# Patient Record
Sex: FEMALE | Race: WHITE | NOT HISPANIC OR LATINO | Employment: OTHER | URBAN - METROPOLITAN AREA
[De-identification: names, ages, dates, MRNs, and addresses within clinical notes are randomized per-mention and may not be internally consistent; named-entity substitution may affect disease eponyms.]

---

## 2017-02-24 ENCOUNTER — ALLSCRIPTS OFFICE VISIT (OUTPATIENT)
Dept: OTHER | Facility: OTHER | Age: 67
End: 2017-02-24

## 2017-03-16 ENCOUNTER — ALLSCRIPTS OFFICE VISIT (OUTPATIENT)
Dept: OTHER | Facility: OTHER | Age: 67
End: 2017-03-16

## 2017-03-16 ENCOUNTER — GENERIC CONVERSION - ENCOUNTER (OUTPATIENT)
Dept: OTHER | Facility: OTHER | Age: 67
End: 2017-03-16

## 2017-03-29 ENCOUNTER — APPOINTMENT (OUTPATIENT)
Dept: PHYSICAL THERAPY | Age: 67
End: 2017-03-29
Payer: COMMERCIAL

## 2017-03-29 PROCEDURE — 97161 PT EVAL LOW COMPLEX 20 MIN: CPT

## 2017-03-29 PROCEDURE — 97110 THERAPEUTIC EXERCISES: CPT

## 2017-04-03 ENCOUNTER — APPOINTMENT (OUTPATIENT)
Dept: PHYSICAL THERAPY | Age: 67
End: 2017-04-03
Payer: COMMERCIAL

## 2017-04-03 PROCEDURE — 97140 MANUAL THERAPY 1/> REGIONS: CPT

## 2017-04-03 PROCEDURE — 97110 THERAPEUTIC EXERCISES: CPT

## 2017-04-06 ENCOUNTER — APPOINTMENT (OUTPATIENT)
Dept: PHYSICAL THERAPY | Age: 67
End: 2017-04-06
Payer: COMMERCIAL

## 2017-04-06 PROCEDURE — 97110 THERAPEUTIC EXERCISES: CPT

## 2017-04-10 ENCOUNTER — APPOINTMENT (OUTPATIENT)
Dept: PHYSICAL THERAPY | Age: 67
End: 2017-04-10
Payer: COMMERCIAL

## 2017-04-13 ENCOUNTER — APPOINTMENT (OUTPATIENT)
Dept: PHYSICAL THERAPY | Age: 67
End: 2017-04-13
Payer: COMMERCIAL

## 2017-04-13 PROCEDURE — 97110 THERAPEUTIC EXERCISES: CPT

## 2017-04-17 ENCOUNTER — APPOINTMENT (OUTPATIENT)
Dept: PHYSICAL THERAPY | Age: 67
End: 2017-04-17
Payer: COMMERCIAL

## 2017-04-17 PROCEDURE — 97110 THERAPEUTIC EXERCISES: CPT

## 2017-04-24 ENCOUNTER — ALLSCRIPTS OFFICE VISIT (OUTPATIENT)
Dept: OTHER | Facility: OTHER | Age: 67
End: 2017-04-24

## 2017-05-03 ENCOUNTER — GENERIC CONVERSION - ENCOUNTER (OUTPATIENT)
Dept: OTHER | Facility: OTHER | Age: 67
End: 2017-05-03

## 2017-05-09 ENCOUNTER — APPOINTMENT (OUTPATIENT)
Dept: PHYSICAL THERAPY | Age: 67
End: 2017-05-09
Payer: COMMERCIAL

## 2017-05-09 PROCEDURE — 97161 PT EVAL LOW COMPLEX 20 MIN: CPT

## 2017-05-15 ENCOUNTER — APPOINTMENT (OUTPATIENT)
Dept: PHYSICAL THERAPY | Age: 67
End: 2017-05-15
Payer: COMMERCIAL

## 2017-05-15 PROCEDURE — 97110 THERAPEUTIC EXERCISES: CPT

## 2017-05-22 ENCOUNTER — APPOINTMENT (OUTPATIENT)
Dept: PHYSICAL THERAPY | Age: 67
End: 2017-05-22
Payer: COMMERCIAL

## 2017-05-22 PROCEDURE — 97110 THERAPEUTIC EXERCISES: CPT

## 2017-05-30 ENCOUNTER — ALLSCRIPTS OFFICE VISIT (OUTPATIENT)
Dept: OTHER | Facility: OTHER | Age: 67
End: 2017-05-30

## 2017-05-30 ENCOUNTER — GENERIC CONVERSION - ENCOUNTER (OUTPATIENT)
Dept: OTHER | Facility: OTHER | Age: 67
End: 2017-05-30

## 2017-06-01 DIAGNOSIS — Z12.31 ENCOUNTER FOR SCREENING MAMMOGRAM FOR MALIGNANT NEOPLASM OF BREAST: ICD-10-CM

## 2017-06-03 LAB
ADEQUACY: (HISTORICAL): NORMAL
CLINICIAN PROVIDIED ICD 9 OR 10 (HISTORICAL): NORMAL
COMMENT (HISTORICAL): NORMAL
DIAGNOSIS (HISTORICAL): NORMAL
HPV HIGH RISK (HISTORICAL): NEGATIVE
NOTE: (HISTORICAL): NORMAL
PERFORMED BY (HISTORICAL): NORMAL
QC REVIEWED BY (HISTORICAL): NORMAL

## 2017-06-05 ENCOUNTER — APPOINTMENT (OUTPATIENT)
Dept: PHYSICAL THERAPY | Age: 67
End: 2017-06-05
Payer: COMMERCIAL

## 2017-06-05 PROCEDURE — 97110 THERAPEUTIC EXERCISES: CPT

## 2017-09-05 ENCOUNTER — GENERIC CONVERSION - ENCOUNTER (OUTPATIENT)
Dept: OTHER | Facility: OTHER | Age: 67
End: 2017-09-05

## 2018-01-12 VITALS
TEMPERATURE: 98.2 F | SYSTOLIC BLOOD PRESSURE: 120 MMHG | DIASTOLIC BLOOD PRESSURE: 80 MMHG | RESPIRATION RATE: 14 BRPM | HEART RATE: 80 BPM

## 2018-01-12 NOTE — MISCELLANEOUS
Message  Spoke with patient regarding DEXA results  Per Dr Malia Willis, osteoporosis in the spine  T-score -3 3  Patient referred to Dr Indio rGiggs for further evaulation and treatment        Signatures   Electronically signed by : Karen Coats, [facility]; Jun 9 2016  9:55AM EST                       (Author)

## 2018-01-13 VITALS
TEMPERATURE: 97.2 F | HEART RATE: 76 BPM | WEIGHT: 155 LBS | SYSTOLIC BLOOD PRESSURE: 110 MMHG | RESPIRATION RATE: 16 BRPM | HEIGHT: 60 IN | DIASTOLIC BLOOD PRESSURE: 72 MMHG | BODY MASS INDEX: 30.43 KG/M2

## 2018-01-13 VITALS
BODY MASS INDEX: 28.94 KG/M2 | SYSTOLIC BLOOD PRESSURE: 110 MMHG | DIASTOLIC BLOOD PRESSURE: 74 MMHG | HEIGHT: 62 IN | WEIGHT: 157.25 LBS

## 2018-01-14 VITALS
DIASTOLIC BLOOD PRESSURE: 80 MMHG | HEART RATE: 76 BPM | SYSTOLIC BLOOD PRESSURE: 136 MMHG | RESPIRATION RATE: 16 BRPM | TEMPERATURE: 96.9 F

## 2018-01-15 NOTE — PROGRESS NOTES
Assessment    1  Acute URI (465 9) (J06 9)    Plan  Acute URI    · Benzonatate 200 MG Oral Capsule; TAKE 1 CAPSULE 3 TIMES DAILY AS  NEEDED    Discussion/Summary    Tea honey supportive care  The patient was counseled regarding  Chief Complaint  persistent sore throat      History of Present Illness  HPI: uri cough come and go two      Review of Systems    Constitutional: no fever and not feeling tired  Cardiovascular: no chest pain  Breasts: no breast swelling and no breast lump  Gastrointestinal: no abdominal pain  Genitourinary: no pelvic pain  Musculoskeletal: no arthralgias and no joint stiffness  Integumentary: no skin lesions  Neurological: no numbness and no confusion  Active Problems    1  Actinic keratosis (702 0) (L57 0)   2  Actinic keratosis (702 0) (L57 0)   3  Acute bronchitis (466 0) (J20 9)   4  Ankle pain, unspecified laterality (719 47) (M25 579)   5  Arthritis (716 90) (M19 90)   6  Cat scratch (919 0,E906 8) (T14 8,W55 03XA)   7  Cerumen impaction (380 4) (H61 20)   8  Cervicalgia (723 1) (M54 2)   9  Chronic rhinitis (472 0) (J31 0)   10  Closed nondisplaced fracture of proximal phalanx of lesser toe of left foot, initial    encounter (826 0) (S92 515A)   11  Colon cancer screening (V76 51) (Z12 11)   12  Depression (311) (F32 9)   13  Dermatitis (692 9) (L30 9)   14  Diverticulitis of colon (562 11) (K57 32)   15  Encounter for routine gynecological examination (V72 31) (Z01 419)   16  Encounter for screening (V82 9) (Z13 9)   17  Encounter for screening colonoscopy (V76 51) (Z12 11)   18  Encounter for screening mammogram for malignant neoplasm of breast (V76 12)    (Z12 31)   19  Ganglion (727 43) (M67 40)   20  Gastrointestinal symptoms (787 99) (R19 8)   21  Hemangioma (228 00) (D18 00)   22  Hepatic disorder (573 9) (K76 9)   23  Injury Of The Nose (959 09)   24  Joint Stiffness Finger(S) (719 54)   25  Lower abdominal pain (789 09) (R10 30)   26   Lumbago (724 2) (M54 5)   27  Menopause (627 2)   28  Migraine headache (346 90) (G43 909)   29  Myalgia And Myositis (729 1)   30  Need for pneumococcal vaccination (V03 82) (Z23)   31  Neoplasm of uncertain behavior of skin (238 2) (D48 5)   32  Neuritis (729 2) (M79 2)   33  Nonallopathic lesion (739 9) (M99 09)   34  Osteoporosis (733 00) (M81 0)   35  Other muscle spasm (728 85) (M62 838)   36  Pap test, as part of routine gynecological examination (V76 2) (Z12 4)   37  Pigmented nevus (216 9) (D22 9)   38  Plantar warts (078 12) (B07 0)   39  Polymyalgia rheumatica (725) (M35 3)   40  Postmenopausal atrophic vaginitis (627 3) (N95 2)   41  Rupture of popliteal cyst (727 51) (M66 0)   42  Sacroiliitis (720 2) (M46 1)   43  Sciatica (724 3) (M54 30)   44  Screening for osteoporosis (V82 81) (Z13 820)   45  Sigmoid diverticulitis (562 11) (K57 32)   46  Temporomandibular dysfunction syndrome (524 60) (M26 60)   47  Tendonitis (726 90) (M77 9)   48  Thoracic spine pain (724 1) (M54 6)   49  Visit for screening mammogram (V76 12) (Z12 31)   50  Vulvar atrophy (624 1) (N90 5)   51  Wart (078 10) (B07 9)    Past Medical History    1  History of Acute upper respiratory infection (465 9) (J06 9)   2  Benign essential hypertension (401 1) (I10)   3  History of Encounter for removal of sutures (V58 32) (Z48 02)   4  History of  3 (V22 2) (Z33 1)   5  History of Heart murmur (785 2) (R01 1)   6  History of acute bronchitis (V12 69) (Z87 09)   7  History of acute pharyngitis (V12 69) (Z87 09)   8  History of low back pain (V13 59) (Z87 39)   9  History of Osteopenia (733 90) (M85 80)   10  History of Rheumatic fever (390) (I00)   11  Screening for genitourinary condition (V81 6) (Z13 89)   12  History of Uterine fibroid (218 9) (D25 9)  Active Problems And Past Medical History Reviewed: The active problems and past medical history were reviewed and updated today  Family History    1   Family history of Bone Cancer 2  Family history of Coronary Arteriosclerosis (V17 49)  Family History Reviewed: The family history was reviewed and updated today  Social History    · Being A Social Drinker   · Marital History - Currently    · Never a smoker  The social history was reviewed and updated today  The social history was reviewed and is unchanged  Surgical History    1  History of Biopsy Of Cervix   2  History of Tonsillectomy With Adenoidectomy  Surgical History Reviewed: The surgical history was reviewed and updated today  Current Meds   1  Calcium 600 600 MG Oral Tablet; 1 Two Times A Day; Therapy: 14ZQD2006 to  Requested for: 78VHN3960 Recorded   2  Multivitamins TABS; 1 Every Day; Therapy: 45NIW9324 to  Requested for: 10MJR4952 Recorded    The medication list was reviewed and updated today  Allergies    1  Penicillins   2  Tetracyclines   3  Vicks Formula 44M    4  Latex   5  Shellfish    Vitals   Recorded: 97AMZ7655 02:43PM   Temperature 96 9 F   Heart Rate 84   Respiration 16   Systolic 248   Diastolic 70     Physical Exam    Constitutional   General appearance: No acute distress, well appearing and well nourished  Eyes   Pupils and irises: Equal, round and reactive to light  Ears, Nose, Mouth, and Throat   External inspection of ears and nose: Normal     Otoscopic examination: Tympanic membranes translucent with normal light reflex  Canals patent without erythema  Oropharynx: Normal with no erythema, edema, exudate or lesions  Pulmonary   Auscultation of lungs: Clear to auscultation  no rales or crackles were heard bilaterally  no rhonchi  no friction rub  no wheezing  Cardiovascular   Auscultation of heart: Normal rate and rhythm, normal S1 and S2, without murmurs  Examination of extremities for edema and/or varicosities: Normal   no edema  Abdomen   Abdomen: Abnormal   The abdomen was flat  Bowel sounds were normal  There was mild tenderness in the suprapubic area   The abdomen was not firm and not rigid  No rebound tenderness  No guarding  Lymphatic   Palpation of lymph nodes in neck: No lymphadenopathy  Musculoskeletal   Gait and station: Normal     Inspection/palpation of joints, bones, and muscles: Normal     Skin   Skin and subcutaneous tissue: Normal without rashes or lesions  Neurologic   Cranial nerves: Cranial nerves 2-12 intact  Reflexes: 2+ and symmetric  Sensation: No sensory loss      Psychiatric   Orientation to person, place, and time: Normal     Mood and affect: Normal          Signatures   Electronically signed by : Maye Crouch MD; Jan 28 2016  2:50PM EST                       (Author)

## 2018-03-01 ENCOUNTER — OFFICE VISIT (OUTPATIENT)
Dept: FAMILY MEDICINE CLINIC | Facility: CLINIC | Age: 68
End: 2018-03-01
Payer: COMMERCIAL

## 2018-03-01 VITALS
TEMPERATURE: 98.4 F | BODY MASS INDEX: 27.79 KG/M2 | HEIGHT: 62 IN | SYSTOLIC BLOOD PRESSURE: 108 MMHG | HEART RATE: 72 BPM | RESPIRATION RATE: 16 BRPM | WEIGHT: 151 LBS | DIASTOLIC BLOOD PRESSURE: 70 MMHG

## 2018-03-01 DIAGNOSIS — J04.0 LARYNGITIS: Primary | ICD-10-CM

## 2018-03-01 DIAGNOSIS — Z13.6 SCREENING FOR CARDIOVASCULAR CONDITION: Primary | ICD-10-CM

## 2018-03-01 PROBLEM — M22.42 CHONDROMALACIA OF LEFT PATELLA: Status: ACTIVE | Noted: 2017-02-24

## 2018-03-01 PROCEDURE — 99213 OFFICE O/P EST LOW 20 MIN: CPT | Performed by: FAMILY MEDICINE

## 2018-03-01 RX ORDER — DIPHENOXYLATE HYDROCHLORIDE AND ATROPINE SULFATE 2.5; .025 MG/1; MG/1
TABLET ORAL DAILY
COMMUNITY
Start: 2005-07-05

## 2018-03-01 RX ORDER — PHENOL 1.4 %
AEROSOL, SPRAY (ML) MUCOUS MEMBRANE 2 TIMES DAILY
COMMUNITY
Start: 2005-07-05

## 2018-03-01 RX ORDER — METHYLPREDNISOLONE 4 MG/1
TABLET ORAL
Qty: 21 TABLET | Refills: 0 | Status: SHIPPED | OUTPATIENT
Start: 2018-03-01 | End: 2020-01-22 | Stop reason: ALTCHOICE

## 2018-03-01 NOTE — PROGRESS NOTES
Subjective:           Sarah Pisano was seen today for sore throat  Diagnoses and all orders for this visit:    Laryngitis            No orders of the defined types were placed in this encounter  There are no Patient Instructions on file for this visit  Mario Brar      HPI  St 3-4 days no fever  hoarseness no cough or dysphagia  Vitals:    03/01/18 1507   BP: 108/70   Pulse: 72   Resp: 16   Temp: 98 4 °F (36 9 °C)       Allergies   Allergen Reactions    Penicillins Hives     Reaction Date: [id number];     Shellfish Allergy      Reaction Date: 47GKQ8996;     Tetracyclines & Related Hives     Reaction Date: 79ALY1395;     Vicks Babyrub  [Liniments]     Latex Rash     Reaction Date: 31Aug2010; No current outpatient prescriptions on file prior to visit  No current facility-administered medications on file prior to visit  History reviewed  No pertinent past medical history  History reviewed  No pertinent surgical history  reports that she has never smoked  She has never used smokeless tobacco  She reports that she drinks alcohol  She reports that she does not use drugs  reports that she has never smoked  She has never used smokeless tobacco     Review of Systems   Constitutional: Negative for activity change and unexpected weight change  HENT: Positive for sore throat and voice change  Negative for congestion, drooling, mouth sores, sinus pain and trouble swallowing  Eyes: Negative for pain  Respiratory: Negative for apnea, cough, chest tightness, shortness of breath, wheezing and stridor  Hoarse voice   Cardiovascular: Negative for chest pain  Gastrointestinal: Negative for abdominal distention, blood in stool and rectal pain  Endocrine: Negative for cold intolerance and polydipsia  Genitourinary: Negative for flank pain and urgency  Musculoskeletal: Positive for arthralgias  Negative for neck pain and neck stiffness     Skin: Negative for color change and rash  Neurological: Negative for dizziness, tremors, seizures and headaches  Hematological: Negative for adenopathy  Psychiatric/Behavioral: Negative for agitation, behavioral problems, confusion, sleep disturbance and suicidal ideas  All other systems reviewed and are negative  Physical Exam   Constitutional: She appears well-developed  HENT:   Head: Normocephalic and atraumatic  Post pharyngeal erythema no exudate   Eyes: Conjunctivae and EOM are normal  Pupils are equal, round, and reactive to light  No scleral icterus  Neck: No JVD present  No thyromegaly present  Cardiovascular: Normal rate and regular rhythm  No murmur heard  Pulmonary/Chest: Effort normal and breath sounds normal  No stridor  She has no wheezes  Abdominal: She exhibits no distension  There is no guarding  Musculoskeletal: She exhibits no deformity  Lymphadenopathy:     She has no cervical adenopathy  Neurological: No cranial nerve deficit  Skin: Capillary refill takes less than 2 seconds  No erythema  Psychiatric: She has a normal mood and affect   Her behavior is normal  Judgment and thought content normal

## 2018-03-03 ENCOUNTER — OFFICE VISIT (OUTPATIENT)
Dept: FAMILY MEDICINE CLINIC | Facility: CLINIC | Age: 68
End: 2018-03-03
Payer: COMMERCIAL

## 2018-03-03 VITALS
HEART RATE: 82 BPM | SYSTOLIC BLOOD PRESSURE: 118 MMHG | WEIGHT: 148 LBS | BODY MASS INDEX: 27.23 KG/M2 | RESPIRATION RATE: 16 BRPM | HEIGHT: 62 IN | DIASTOLIC BLOOD PRESSURE: 68 MMHG | TEMPERATURE: 96.8 F

## 2018-03-03 DIAGNOSIS — B96.89 ACUTE BACTERIAL PHARYNGITIS: Primary | ICD-10-CM

## 2018-03-03 DIAGNOSIS — J02.8 ACUTE BACTERIAL PHARYNGITIS: Primary | ICD-10-CM

## 2018-03-03 PROCEDURE — 3008F BODY MASS INDEX DOCD: CPT | Performed by: FAMILY MEDICINE

## 2018-03-03 PROCEDURE — 99213 OFFICE O/P EST LOW 20 MIN: CPT | Performed by: FAMILY MEDICINE

## 2018-03-03 RX ORDER — AZITHROMYCIN 200 MG/5ML
POWDER, FOR SUSPENSION ORAL
Qty: 30 ML | Refills: 0 | Status: SHIPPED | OUTPATIENT
Start: 2018-03-03 | End: 2020-01-22 | Stop reason: ALTCHOICE

## 2018-03-03 RX ORDER — IBUPROFEN 200 MG
TABLET ORAL EVERY 6 HOURS PRN
COMMUNITY
End: 2020-01-22 | Stop reason: ALTCHOICE

## 2018-03-03 NOTE — PROGRESS NOTES
Assessment/Plan:    No problem-specific Assessment & Plan notes found for this encounter  Diagnoses and all orders for this visit:    Acute bacterial pharyngitis  -     azithromycin (ZITHROMAX) 200 mg/5 mL suspension; Give the patient 672 mg (16 8 ml) by mouth the first day then 336 mg (8 4 ml) by mouth daily for 4 days  Other orders  -     ibuprofen (MOTRIN) 200 mg tablet; Take by mouth every 6 (six) hours as needed for mild pain          There are no Patient Instructions on file for this visit  No Follow-up on file  Subjective:      Patient ID: Rachael Bateman is a 75 y o  female  Chief Complaint   Patient presents with    Sore Throat    Laryngitis    Cough     with phlegm       Pt cannot talk history taken via   Pt was seen  At [facility], was oprescribed mouthwash for pain - which the pt could not find as well as a pack of steroids  Pt did not want to take the steroids as she gets palpitations generally  Pt took some advil over two days  Sore Throat    Associated symptoms include congestion and coughing  Pertinent negatives include no abdominal pain  Cough   Associated symptoms include a sore throat  Pertinent negatives include no chest pain, chills or wheezing  The following portions of the patient's history were reviewed and updated as appropriate: allergies, current medications, past family history, past medical history, past social history, past surgical history and problem list     Review of Systems   Constitutional: Negative for activity change, appetite change and chills  HENT: Positive for congestion and sore throat  Eyes: Negative for discharge and itching  Respiratory: Positive for cough  Negative for wheezing  Cardiovascular: Negative for chest pain, palpitations and leg swelling  Gastrointestinal: Negative for abdominal pain  Endocrine: Negative for cold intolerance and heat intolerance           Current Outpatient Prescriptions   Medication Sig Dispense Refill    calcium carbonate (CALCIUM 600) 600 MG tablet Take by mouth 2 (two) times a day      ibuprofen (MOTRIN) 200 mg tablet Take by mouth every 6 (six) hours as needed for mild pain      multivitamin (THERAGRAN) TABS Take by mouth daily      al mag oxide-diphenhydramine-lidocaine viscous (MAGIC MOUTHWASH) 1:1:1 suspension Swish and spit 10 mL every 4 (four) hours as needed for mouth pain or discomfort 180 mL 0    azithromycin (ZITHROMAX) 200 mg/5 mL suspension Give the patient 672 mg (16 8 ml) by mouth the first day then 336 mg (8 4 ml) by mouth daily for 4 days  30 mL 0    Methylprednisolone 4 MG TBPK Use as directed on package 21 tablet 0     No current facility-administered medications for this visit  Objective:    /68   Pulse 82   Temp (!) 96 8 °F (36 °C)   Resp 16   Ht 5' 2" (1 575 m)   Wt 67 1 kg (148 lb)   BMI 27 07 kg/m²        Physical Exam   Constitutional: She appears well-developed and well-nourished  HENT:   Head: Normocephalic and atraumatic  Right Ear: Tympanic membrane is bulging  Left Ear: Tympanic membrane is bulging  Nose: Mucosal edema present  Mouth/Throat:       Eyes: Pupils are equal, round, and reactive to light  Neck: Normal range of motion  Cardiovascular: Normal rate  Pulmonary/Chest: Effort normal    Abdominal: Soft  Skin: She is not diaphoretic  Nursing note and vitals reviewed               Sanna Broderick DO

## 2018-03-03 NOTE — PATIENT INSTRUCTIONS
Cold Symptoms   AMBULATORY CARE:   Cold symptoms  include sneezing, dry throat, a stuffy nose, headache, watery eyes, and a cough  Your cough may be dry, or you may cough up mucus  You may also have muscle aches, joint pain, and tiredness  Rarely, you may have a fever  Cold symptoms occur from inflammation in your upper respiratory system caused by a virus  Most colds go away without treatment  Seek care immediately if:   · You have increased tiredness and weakness  · You are unable to eat  · Your heart is beating much faster than usual for you  · You see white spots in the back of your throat and your neck is swollen and sore to the touch  · You see pinpoint or larger reddish-purple dots on your skin  Contact your healthcare provider if:   · You have a fever higher than 102°F (38 9°C)  · You have new or worsening shortness of breath  · You have thick nasal drainage for more than 2 days  · Your symptoms do not improve or get worse within 5 days  · You have questions or concerns about your condition or care  Treatment for cold symptoms  may include NSAIDS to decrease muscle aches and fever  Cold medicines may also be given to decrease coughing, nasal stuffiness, sneezing, and a runny nose  Manage your cold symptoms: The following may help relieve cold symptoms, such as a dry throat and congestion:  · Gargle with mouthwash or warm salt water as directed  · Suck on throat lozenges or hard candy  · Use a cold or warm vaporizer or humidifier to ease your breathing  · Rest for at least 2 days and then as needed to decrease tiredness and weakness  · Use petroleum based jelly around your nostrils to decrease irritation from blowing your nose  · Drink plenty of liquids  Liquids will help thin and loosen thick mucus so you can cough it up  Liquids will also keep you hydrated   Ask your healthcare provider which liquids are best for you and how much to drink each day   Prevent the spread of germs  by washing your hands often  You can spread your cold germs to others for at least 3 days after your symptoms start  Do not share items, such as eating utensils  Cover your nose and mouth when you cough or sneeze using the crook of your elbow instead of your hands  Throw used tissues in the garbage  Do not smoke:  Smoking may worsen your symptoms and increase the length of time you feel sick  Talk with your healthcare provider if you need help to stop smoking  Follow up with your healthcare provider as directed:  Write down your questions so you remember to ask them during your visits  © 2017 [facility] Information is for End User's use only and may not be sold, redistributed or otherwise used for commercial purposes  All illustrations and images included in CareNotes® are the copyrighted property of [employer]  or [employer]  The above information is an  only  It is not intended as medical advice for individual conditions or treatments  Talk to your doctor, nurse or pharmacist before following any medical regimen to see if it is safe and effective for you

## 2018-10-30 ENCOUNTER — ANNUAL EXAM (OUTPATIENT)
Dept: OBGYN CLINIC | Facility: CLINIC | Age: 68
End: 2018-10-30
Payer: COMMERCIAL

## 2018-10-30 VITALS — WEIGHT: 151.4 LBS | DIASTOLIC BLOOD PRESSURE: 70 MMHG | BODY MASS INDEX: 27.69 KG/M2 | SYSTOLIC BLOOD PRESSURE: 118 MMHG

## 2018-10-30 DIAGNOSIS — Z12.39 BREAST CANCER SCREENING: Primary | ICD-10-CM

## 2018-10-30 DIAGNOSIS — Z01.419 ENCOUNTER FOR ANNUAL ROUTINE GYNECOLOGICAL EXAMINATION: ICD-10-CM

## 2018-10-30 PROCEDURE — G0101 CA SCREEN;PELVIC/BREAST EXAM: HCPCS | Performed by: OBSTETRICS & GYNECOLOGY

## 2018-10-30 NOTE — PROGRESS NOTES
Assessment/Plan:    No problem-specific Assessment & Plan notes found for this encounter  Normal gynecological physical examination  Self-breast examination stressed  Mammogram ordered  Discussed regular exercise, healthy diet, importance of vitamin D and calcium supplements  Discussed importance of sun block use during periods of prolonged sun exposure  Patient will be seen in 1 year for routine gynecologic and medical examination  Patient will call office for any problems, concerns, or issues which may arise during the interim  Diagnoses and all orders for this visit:    Breast cancer screening  -     Mammo screening bilateral w cad; Future    Encounter for annual routine gynecological examination        Subjective:      Patient ID: Camden Campos is a 70 y o  female  Patient is a 69-year-old female who presents to the office for her annual gynecologic and medical examination  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 [place] and she and her  just got back from a trip to [place]  Patient has no complaints today  Patient has a history of osteoporosis and was taking Fosamax, but recently stopped the medication due to upcoming dental work  She denies any vaginal bleeding or discharge, pelvic pain, abdominal pain/discharge, vasomotor symptoms, insomnia, mood lability or weight change  She reports normal bowel and bladder habits  She denies any breast changes, masses, pain or discharge  She does regular self-breast examinations and is up to date on mammography  She is up to date on colonoscopy screening  She is not currently being followed for any significant medical issues  The following portions of the patient's history were reviewed and updated as appropriate: allergies, current medications, past family history, past medical history, past social history, past surgical history and problem list     Review of Systems   Constitutional: Negative  Negative for unexpected weight change  HENT: Negative  Eyes: Negative  Respiratory: Negative  Cardiovascular: Negative  Gastrointestinal: Negative  Negative for abdominal pain, blood in stool, nausea and vomiting  Endocrine: Negative  Genitourinary: Negative  Negative for dysuria, frequency, hematuria, pelvic pain, vaginal bleeding and vaginal discharge  Musculoskeletal: Negative  Skin: Negative  Neurological: Negative  Psychiatric/Behavioral: Negative  Objective:      /70   Wt 68 7 kg (151 lb 6 4 oz)   BMI 27 69 kg/m²          Physical Exam   Constitutional: She appears well-developed and well-nourished  HENT:   Head: Normocephalic  Eyes: Pupils are equal, round, and reactive to light  Neck: Normal range of motion  Neck supple  Cardiovascular: Normal rate and regular rhythm  Pulmonary/Chest: Effort normal and breath sounds normal  Right breast exhibits no inverted nipple, no mass, no nipple discharge, no skin change and no tenderness  Left breast exhibits no inverted nipple, no mass, no nipple discharge, no skin change and no tenderness  Breasts are symmetrical    Abdominal: Soft  Normal appearance and bowel sounds are normal    Genitourinary: Rectum normal, vagina normal and uterus normal  Rectal exam shows guaiac negative stool  Pelvic exam was performed with patient supine  Right adnexum displays no mass, no tenderness and no fullness  Left adnexum displays no mass, no tenderness and no fullness  No vaginal discharge found  Lymphadenopathy:     She has no cervical adenopathy  She has no axillary adenopathy  Right: No inguinal and no supraclavicular adenopathy present  Left: No inguinal and no supraclavicular adenopathy present  Neurological: She is alert  Skin: Skin is intact  No rash noted  Psychiatric: She has a normal mood and affect   Her speech is normal and behavior is normal  Judgment and thought content normal  Cognition and memory are normal

## 2019-01-31 ENCOUNTER — HOSPITAL ENCOUNTER (OUTPATIENT)
Dept: MAMMOGRAPHY | Facility: HOSPITAL | Age: 69
Discharge: HOME | End: 2019-01-31
Payer: COMMERCIAL

## 2019-01-31 VITALS — HEIGHT: 62 IN | WEIGHT: 151 LBS | BODY MASS INDEX: 27.79 KG/M2

## 2019-01-31 DIAGNOSIS — Z12.39 BREAST CANCER SCREENING: ICD-10-CM

## 2019-01-31 PROCEDURE — 77067 SCR MAMMO BI INCL CAD: CPT

## 2020-01-22 ENCOUNTER — OFFICE VISIT (OUTPATIENT)
Dept: FAMILY MEDICINE CLINIC | Facility: CLINIC | Age: 70
End: 2020-01-22
Payer: COMMERCIAL

## 2020-01-22 VITALS
DIASTOLIC BLOOD PRESSURE: 86 MMHG | HEIGHT: 62 IN | TEMPERATURE: 96.6 F | SYSTOLIC BLOOD PRESSURE: 140 MMHG | OXYGEN SATURATION: 97 % | RESPIRATION RATE: 16 BRPM | BODY MASS INDEX: 27.94 KG/M2 | WEIGHT: 151.8 LBS | HEART RATE: 72 BPM

## 2020-01-22 DIAGNOSIS — Z12.11 SCREEN FOR COLON CANCER: ICD-10-CM

## 2020-01-22 DIAGNOSIS — G43.909 MIGRAINE WITHOUT STATUS MIGRAINOSUS, NOT INTRACTABLE, UNSPECIFIED MIGRAINE TYPE: Primary | ICD-10-CM

## 2020-01-22 DIAGNOSIS — Z11.59 NEED FOR HEPATITIS C SCREENING TEST: ICD-10-CM

## 2020-01-22 DIAGNOSIS — Z13.6 SCREENING FOR CARDIOVASCULAR CONDITION: ICD-10-CM

## 2020-01-22 PROCEDURE — 3725F SCREEN DEPRESSION PERFORMED: CPT | Performed by: FAMILY MEDICINE

## 2020-01-22 PROCEDURE — 1160F RVW MEDS BY RX/DR IN RCRD: CPT | Performed by: FAMILY MEDICINE

## 2020-01-22 PROCEDURE — 3288F FALL RISK ASSESSMENT DOCD: CPT | Performed by: FAMILY MEDICINE

## 2020-01-22 PROCEDURE — 1100F PTFALLS ASSESS-DOCD GE2>/YR: CPT | Performed by: FAMILY MEDICINE

## 2020-01-22 PROCEDURE — 1036F TOBACCO NON-USER: CPT | Performed by: FAMILY MEDICINE

## 2020-01-22 PROCEDURE — 3008F BODY MASS INDEX DOCD: CPT | Performed by: FAMILY MEDICINE

## 2020-01-22 PROCEDURE — 99213 OFFICE O/P EST LOW 20 MIN: CPT | Performed by: FAMILY MEDICINE

## 2020-01-22 RX ORDER — RIZATRIPTAN BENZOATE 5 MG/1
5 TABLET, ORALLY DISINTEGRATING ORAL ONCE AS NEEDED
Qty: 9 TABLET | Refills: 0 | Status: SHIPPED | OUTPATIENT
Start: 2020-01-22 | End: 2022-03-29 | Stop reason: ALTCHOICE

## 2020-01-22 NOTE — PROGRESS NOTES
Chief Complaint   Patient presents with    Migraines     [id number]       Subjective:           Problem List Items Addressed This Visit        Cardiovascular and Mediastinum    Migraine headache - Primary    Relevant Medications    rizatriptan (MAXALT-MLT) 5 MG disintegrating tablet    Other Relevant Orders    Comprehensive metabolic panel      Other Visit Diagnoses     Screening for cardiovascular condition        Relevant Orders    Comprehensive metabolic panel    Lipid panel    Screen for colon cancer        Relevant Orders    Cologuard    Need for hepatitis C screening test        Relevant Orders    Hepatitis C antibody              Orders Placed This Encounter   Procedures    Cologuard     Standing Status:   Future     Standing Expiration Date:   1/22/2021    Comprehensive metabolic panel     This is a patient instruction: Patient fasting for 8 hours or longer recommended  Standing Status:   Future     Standing Expiration Date:   1/22/2021    Lipid panel     This is a patient instruction: This test requires patient fasting for 10-12 hours or longer  Drinking of black coffee or black tea is acceptable  Standing Status:   Future     Standing Expiration Date:   1/22/2021    Hepatitis C antibody     Standing Status:   Future     Standing Expiration Date:   1/22/2021       Patient Instructions   Stay current with GYN, Mammogram, Colonoscopy   Consider Cologuard  Neurology follow up if migraines worsen or persist       Riboflavin 400mg daily   Hydrate welll    BMI Counseling: Body mass index is 27 76 kg/m²  Discussed the patient's BMI with her  [facility]    Chief Complaint   Patient presents with   Mercedes Rede is in for follow-up migraine headaches ocular  In nature  Under Optho care  Field test  Lite sensitive  Stress      /86   Pulse 72   Temp (!) 96 6 °F (35 9 °C)   Resp 16   Ht 5' 2" (1 575 m)   Wt 68 9 kg (151 lb 12 8 oz)   SpO2 97%   BMI 27 76 kg/m²       Allergies   Allergen Reactions    Blistex Medicated [Lip Medex]     Penicillins Hives     Reaction Date: [id number];     Shellfish Allergy      Reaction Date: 03Mar2005;     Tetracyclines & Related Hives     Reaction Date: 03Mar2005;     Vicks Babyrub  [Liniments]     Latex Rash     Reaction Date: 31Aug2010;        Current Outpatient Medications on File Prior to Visit   Medication Sig Dispense Refill    calcium carbonate (CALCIUM 600) 600 MG tablet Take by mouth 2 (two) times a day      multivitamin (THERAGRAN) TABS Take by mouth daily      [DISCONTINUED] al mag oxide-diphenhydramine-lidocaine viscous (MAGIC MOUTHWASH) 1:1:1 suspension Swish and spit 10 mL every 4 (four) hours as needed for mouth pain or discomfort 180 mL 0    [DISCONTINUED] azithromycin (ZITHROMAX) 200 mg/5 mL suspension Give the patient 672 mg (16 8 ml) by mouth the first day then 336 mg (8 4 ml) by mouth daily for 4 days  30 mL 0    [DISCONTINUED] ibuprofen (MOTRIN) 200 mg tablet Take by mouth every 6 (six) hours as needed for mild pain      [DISCONTINUED] Methylprednisolone 4 MG TBPK Use as directed on package 21 tablet 0     No current facility-administered medications on file prior to visit  No past medical history on file  No past surgical history on file  reports that she has never smoked  She has never used smokeless tobacco  She reports that she drinks alcohol  She reports that she does not use drugs  reports that she has never smoked  She has never used smokeless tobacco         Review of Systems   Constitutional: Negative for activity change, chills and fever  HENT: Negative for dental problem, hearing loss, sinus pain, sore throat and trouble swallowing  Eyes: Positive for photophobia and visual disturbance  Negative for pain  Field cut then waves  Blind spot  Respiratory: Negative for apnea, cough, chest tightness, shortness of breath, wheezing and stridor      Cardiovascular: Negative for chest pain  Gastrointestinal: Negative for abdominal distention, blood in stool and rectal pain  Endocrine: Negative for cold intolerance and polydipsia  Genitourinary: Negative for flank pain and urgency  Musculoskeletal: Negative for arthralgias, gait problem and neck pain  Skin: Negative for color change and rash  Neurological: Negative for dizziness, tremors, seizures, facial asymmetry, weakness, light-headedness, numbness and headaches  Hematological: Negative for adenopathy  Psychiatric/Behavioral: Negative for agitation, behavioral problems, confusion, self-injury, sleep disturbance and suicidal ideas  All other systems reviewed and are negative  Physical Exam   Constitutional: She appears well-developed  HENT:   Head: Normocephalic and atraumatic  Mouth/Throat: Oropharynx is clear and moist    Eyes: Pupils are equal, round, and reactive to light  Conjunctivae and EOM are normal  No scleral icterus  Neck: No JVD present  No thyromegaly present  Cardiovascular: Normal rate and regular rhythm  No murmur heard  Pulmonary/Chest: Effort normal and breath sounds normal  No stridor  Abdominal: She exhibits no distension  There is no guarding  Musculoskeletal: Normal range of motion  She exhibits no edema, tenderness or deformity  Lymphadenopathy:     She has no cervical adenopathy  Neurological: No cranial nerve deficit  Skin: Capillary refill takes less than 2 seconds  No erythema  Psychiatric: She has a normal mood and affect   Her behavior is normal  Judgment and thought content normal

## 2020-01-22 NOTE — PATIENT INSTRUCTIONS
Stay current with GYN, Mammogram, Colonoscopy   Consider Cologuard  Neurology follow up if migraines worsen or persist       Riboflavin 400mg daily      Hydrate welll

## 2021-01-21 ENCOUNTER — LAB (OUTPATIENT)
Dept: LAB | Facility: CLINIC | Age: 71
End: 2021-01-21
Payer: COMMERCIAL

## 2021-01-21 ENCOUNTER — TRANSCRIBE ORDERS (OUTPATIENT)
Dept: LAB | Facility: CLINIC | Age: 71
End: 2021-01-21

## 2021-01-21 DIAGNOSIS — Z11.59 NEED FOR HEPATITIS C SCREENING TEST: ICD-10-CM

## 2021-01-21 DIAGNOSIS — Z13.6 SCREENING FOR CARDIOVASCULAR CONDITION: ICD-10-CM

## 2021-01-21 DIAGNOSIS — G43.909 MIGRAINE WITHOUT STATUS MIGRAINOSUS, NOT INTRACTABLE, UNSPECIFIED MIGRAINE TYPE: ICD-10-CM

## 2021-01-21 LAB
ALBUMIN SERPL BCP-MCNC: 4 G/DL (ref 3.5–5)
ALP SERPL-CCNC: 97 U/L (ref 46–116)
ALT SERPL W P-5'-P-CCNC: 23 U/L (ref 12–78)
ANION GAP SERPL CALCULATED.3IONS-SCNC: 3 MMOL/L (ref 4–13)
AST SERPL W P-5'-P-CCNC: 22 U/L (ref 5–45)
BILIRUB SERPL-MCNC: 0.56 MG/DL (ref 0.2–1)
BUN SERPL-MCNC: 13 MG/DL (ref 5–25)
CALCIUM SERPL-MCNC: 9.2 MG/DL (ref 8.3–10.1)
CHLORIDE SERPL-SCNC: 108 MMOL/L (ref 100–108)
CHOLEST SERPL-MCNC: 280 MG/DL (ref 50–200)
CO2 SERPL-SCNC: 30 MMOL/L (ref 21–32)
CREAT SERPL-MCNC: 0.61 MG/DL (ref 0.6–1.3)
GFR SERPL CREATININE-BSD FRML MDRD: 92 ML/MIN/1.73SQ M
GLUCOSE P FAST SERPL-MCNC: 99 MG/DL (ref 65–99)
HDLC SERPL-MCNC: 91 MG/DL
LDLC SERPL CALC-MCNC: 173 MG/DL (ref 0–100)
NONHDLC SERPL-MCNC: 189 MG/DL
POTASSIUM SERPL-SCNC: 4.3 MMOL/L (ref 3.5–5.3)
PROT SERPL-MCNC: 7 G/DL (ref 6.4–8.2)
SODIUM SERPL-SCNC: 141 MMOL/L (ref 136–145)
TRIGL SERPL-MCNC: 78 MG/DL

## 2021-01-21 PROCEDURE — 36415 COLL VENOUS BLD VENIPUNCTURE: CPT

## 2021-01-21 PROCEDURE — 80053 COMPREHEN METABOLIC PANEL: CPT

## 2021-01-21 PROCEDURE — 86803 HEPATITIS C AB TEST: CPT

## 2021-01-21 PROCEDURE — 80061 LIPID PANEL: CPT

## 2021-01-22 LAB — HCV AB SER QL: ABNORMAL

## 2021-01-27 ENCOUNTER — ANNUAL EXAM (OUTPATIENT)
Dept: OBGYN CLINIC | Facility: CLINIC | Age: 71
End: 2021-01-27
Payer: COMMERCIAL

## 2021-01-27 VITALS
DIASTOLIC BLOOD PRESSURE: 90 MMHG | SYSTOLIC BLOOD PRESSURE: 130 MMHG | BODY MASS INDEX: 24.03 KG/M2 | WEIGHT: 144.2 LBS | HEIGHT: 65 IN

## 2021-01-27 DIAGNOSIS — Z01.419 ENCOUNTER FOR GYNECOLOGICAL EXAMINATION WITHOUT ABNORMAL FINDING: Primary | ICD-10-CM

## 2021-01-27 DIAGNOSIS — Z12.31 ENCOUNTER FOR SCREENING MAMMOGRAM FOR MALIGNANT NEOPLASM OF BREAST: ICD-10-CM

## 2021-01-27 DIAGNOSIS — M81.0 OSTEOPOROSIS, UNSPECIFIED OSTEOPOROSIS TYPE, UNSPECIFIED PATHOLOGICAL FRACTURE PRESENCE: ICD-10-CM

## 2021-01-27 DIAGNOSIS — Z12.11 COLON CANCER SCREENING: ICD-10-CM

## 2021-01-27 PROCEDURE — G0101 CA SCREEN;PELVIC/BREAST EXAM: HCPCS | Performed by: PHYSICIAN ASSISTANT

## 2021-01-27 NOTE — PROGRESS NOTES
Assessment/Plan:    No problem-specific Assessment & Plan notes found for this encounter  Diagnoses and all orders for this visit:    Encounter for gynecological examination without abnormal finding    Encounter for screening mammogram for malignant neoplasm of breast  -     Mammo screening bilateral w 3d & cad; Future    Osteoporosis, unspecified osteoporosis type, unspecified pathological fracture presence  -     DXA bone density spine hip and pelvis; Future    Colon cancer screening  -     Cologuard; Future        Pap not indicated  Order for mammogram entered  Stressed the importance of monthly SBE  Order for DXA scan entered  Recommended f/u with rheumatologist for osteoporosis  Cologuard ordered; office will fax form  We will call with results  If no problems, patient to return in  1 year for routine gyn care  Subjective:      Patient ID: Trudi Matias is a 69 y o  female  Patient is here for yearly gyn exam   States she is doing well overall  Denies any lingering menopausal issues  She also denies bowel/bladder changes, vaginal bleeding, pelvic pain, bloating, abdominal pain, n/v, change in appetite, and thyroid disease  Patient has history of osteoporosis; has not had a DXA scan in 5 years  Was on Fosamax previously but stopped due to need for dental work  Has not had regular f/u with her rheumatologist   She takes calcium and does weight bearing exercise  Patient has never had a colonoscopy  Patient is overdue for a mammogram   She is not performing self-breast exam   Denies new masses, skin changes, nipple discharge, and pain/tenderness  The following portions of the patient's history were reviewed and updated as appropriate: allergies, current medications, past family history, past medical history, past social history, past surgical history and problem list     Review of Systems   Constitutional: Negative for appetite change and unexpected weight change  Cardiovascular:        No masses, skin changes, nipple discharge, and pain/tenderness  Gastrointestinal: Negative for abdominal distention, abdominal pain, constipation, diarrhea, nausea and vomiting  Genitourinary: Negative for difficulty urinating, dysuria, frequency, genital sores, hematuria, menstrual problem, pelvic pain, urgency, vaginal bleeding, vaginal discharge and vaginal pain  Objective:      /90   Ht 5' 5" (1 651 m)   Wt 65 4 kg (144 lb 3 2 oz)   BMI 24 00 kg/m²          Physical Exam  Vitals signs reviewed  Exam conducted with a chaperone present  Constitutional:       Appearance: Normal appearance  She is well-developed and normal weight  Neck:      Musculoskeletal: Neck supple  Thyroid: No thyromegaly  Pulmonary:      Effort: Pulmonary effort is normal    Chest:      Breasts: Breasts are symmetrical          Right: Normal  No swelling, bleeding, inverted nipple, mass, nipple discharge, skin change or tenderness  Left: Normal  No swelling, bleeding, inverted nipple, mass, nipple discharge, skin change or tenderness  Abdominal:      General: Abdomen is flat  There is no distension  Palpations: Abdomen is soft  Tenderness: There is no abdominal tenderness  Genitourinary:     General: Normal vulva  Pubic Area: No rash  Labia:         Right: No rash, tenderness, lesion or injury  Left: No rash, tenderness, lesion or injury  Vagina: Normal  No vaginal discharge, erythema, tenderness or bleeding  Cervix: Normal       Uterus: Normal        Adnexa: Right adnexa normal and left adnexa normal         Right: No mass, tenderness or fullness  Left: No mass, tenderness or fullness  Rectum: No external hemorrhoid  Comments: Vaginal atrophy present  Lymphadenopathy:      Cervical: No cervical adenopathy  Upper Body:      Right upper body: No supraclavicular or axillary adenopathy        Left upper body: No supraclavicular or axillary adenopathy  Lower Body: No right inguinal adenopathy  No left inguinal adenopathy  Skin:     General: Skin is warm and dry  Neurological:      Mental Status: She is alert and oriented to person, place, and time  Psychiatric:         Mood and Affect: Mood normal          Behavior: Behavior normal  Behavior is cooperative  Thought Content:  Thought content normal          Judgment: Judgment normal

## 2021-01-27 NOTE — PATIENT INSTRUCTIONS
Go for mammogram   Practice monthly self-breast exam     Go for DXA scan    F/u with rheumatologist     Do Cologuard test

## 2021-02-13 DIAGNOSIS — Z23 ENCOUNTER FOR IMMUNIZATION: ICD-10-CM

## 2021-02-21 ENCOUNTER — IMMUNIZATIONS (OUTPATIENT)
Dept: FAMILY MEDICINE CLINIC | Facility: HOSPITAL | Age: 71
End: 2021-02-21

## 2021-02-21 DIAGNOSIS — Z23 ENCOUNTER FOR IMMUNIZATION: Primary | ICD-10-CM

## 2021-02-21 PROCEDURE — 0001A SARS-COV-2 / COVID-19 MRNA VACCINE (PFIZER-BIONTECH) 30 MCG: CPT

## 2021-02-21 PROCEDURE — 91300 SARS-COV-2 / COVID-19 MRNA VACCINE (PFIZER-BIONTECH) 30 MCG: CPT

## 2021-03-14 ENCOUNTER — IMMUNIZATIONS (OUTPATIENT)
Dept: FAMILY MEDICINE CLINIC | Facility: HOSPITAL | Age: 71
End: 2021-03-14

## 2021-03-14 DIAGNOSIS — Z23 ENCOUNTER FOR IMMUNIZATION: Primary | ICD-10-CM

## 2021-03-14 PROCEDURE — 91300 SARS-COV-2 / COVID-19 MRNA VACCINE (PFIZER-BIONTECH) 30 MCG: CPT

## 2021-03-14 PROCEDURE — 0002A SARS-COV-2 / COVID-19 MRNA VACCINE (PFIZER-BIONTECH) 30 MCG: CPT

## 2021-04-26 ENCOUNTER — HOSPITAL ENCOUNTER (OUTPATIENT)
Dept: RADIOLOGY | Age: 71
Discharge: HOME | End: 2021-04-26
Payer: COMMERCIAL

## 2021-04-26 DIAGNOSIS — M81.0 OSTEOPOROSIS, UNSPECIFIED OSTEOPOROSIS TYPE, UNSPECIFIED PATHOLOGICAL FRACTURE PRESENCE: ICD-10-CM

## 2021-04-26 PROCEDURE — 77080 DXA BONE DENSITY AXIAL: CPT

## 2021-04-27 ENCOUNTER — TELEPHONE (OUTPATIENT)
Dept: OBGYN CLINIC | Facility: CLINIC | Age: 71
End: 2021-04-27

## 2021-04-27 NOTE — TELEPHONE ENCOUNTER
Spoke with patient regarding DXA scan results - osteoporosis  There has been a decrease in BMD in both spine and hip  Patient plans to call rheumatologist today for appointment  Call with any problems

## 2021-08-17 ENCOUNTER — TELEPHONE (OUTPATIENT)
Dept: OBGYN CLINIC | Facility: CLINIC | Age: 71
End: 2021-08-17

## 2021-08-17 NOTE — TELEPHONE ENCOUNTER
Patient has question on whether she should have an ABUS due to dense breasts  Had a friend who was recently diagnosed with breast cancer and had history of dense breasts

## 2021-08-19 ENCOUNTER — HOSPITAL ENCOUNTER (OUTPATIENT)
Dept: RADIOLOGY | Age: 71
Discharge: HOME | End: 2021-08-19
Payer: COMMERCIAL

## 2021-08-19 VITALS — BODY MASS INDEX: 24.66 KG/M2 | HEIGHT: 65 IN | WEIGHT: 148 LBS

## 2021-08-19 DIAGNOSIS — Z12.31 ENCOUNTER FOR SCREENING MAMMOGRAM FOR MALIGNANT NEOPLASM OF BREAST: ICD-10-CM

## 2021-08-19 PROCEDURE — 77067 SCR MAMMO BI INCL CAD: CPT

## 2021-08-19 PROCEDURE — 77063 BREAST TOMOSYNTHESIS BI: CPT

## 2021-09-13 ENCOUNTER — PROCEDURE VISIT (OUTPATIENT)
Dept: FAMILY MEDICINE CLINIC | Facility: CLINIC | Age: 71
End: 2021-09-13
Payer: COMMERCIAL

## 2021-09-13 VITALS
HEART RATE: 78 BPM | DIASTOLIC BLOOD PRESSURE: 86 MMHG | TEMPERATURE: 97.4 F | BODY MASS INDEX: 23.82 KG/M2 | SYSTOLIC BLOOD PRESSURE: 140 MMHG | HEIGHT: 65 IN | RESPIRATION RATE: 18 BRPM | OXYGEN SATURATION: 97 % | WEIGHT: 143 LBS

## 2021-09-13 DIAGNOSIS — E78.2 MIXED HYPERLIPIDEMIA: ICD-10-CM

## 2021-09-13 DIAGNOSIS — G43.909 MIGRAINE WITHOUT STATUS MIGRAINOSUS, NOT INTRACTABLE, UNSPECIFIED MIGRAINE TYPE: ICD-10-CM

## 2021-09-13 DIAGNOSIS — M81.0 OSTEOPOROSIS, UNSPECIFIED OSTEOPOROSIS TYPE, UNSPECIFIED PATHOLOGICAL FRACTURE PRESENCE: ICD-10-CM

## 2021-09-13 DIAGNOSIS — H61.23 BILATERAL IMPACTED CERUMEN: Primary | ICD-10-CM

## 2021-09-13 DIAGNOSIS — Z71.1 CONCERN ABOUT INFECTIOUS HEPATITIS WITHOUT DIAGNOSIS: ICD-10-CM

## 2021-09-13 PROCEDURE — 69210 REMOVE IMPACTED EAR WAX UNI: CPT | Performed by: FAMILY MEDICINE

## 2021-09-13 NOTE — PROGRESS NOTES
Subjective:           Problem List Items Addressed This Visit        Cardiovascular and Mediastinum    Migraine headache stable cont  medication       Nervous and Auditory    Bilateral impacted cerumen - Primary cont  debrox    Relevant Orders    Ear cerumen removal       Musculoskeletal and Integument    Osteoporosis cont follow up      Other Visit Diagnoses     Mixed hyperlipidemia     Low fat labs red yeast rice option    Relevant Orders    Comprehensive metabolic panel    Lipid panel              Orders Placed This Encounter   Procedures    Ear cerumen removal     This order was created via procedure documentation    Comprehensive metabolic panel     This is a patient instruction: Patient fasting for 8 hours or longer recommended  Standing Status:   Future     Standing Expiration Date:   9/13/2022    Lipid panel     This is a patient instruction: This test requires patient fasting for 10-12 hours or longer  Drinking of black coffee or black tea is acceptable  Standing Status:   Future     Standing Expiration Date:   9/13/2022       Patient Instructions   Use debrox 3 days per week as needed to prevent ear wax  Stay current with screenings and vaccinations labs low fat diet consider statin if LDL elevated or Red yeast Rice 600mg daily PM    BMI Counseling: Body mass index is 23 8 kg/m²  Discussed the patient's BMI with her   [facility]    Chief Complaint   Patient presents with    Cerumen Impaction     wax removal     HPI   ear wax h/o migraines  /86   Pulse 78   Temp (!) 97 4 °F (36 3 °C)   Resp 18   Ht 5' 5" (1 651 m)   Wt 64 9 kg (143 lb)   SpO2 97%   BMI 23 80 kg/m²       Allergies   Allergen Reactions    Blistex Medicated [Lip Medex]     Penicillins Hives     Reaction Date: [id number];     Shellfish Allergy - Food Allergy      Reaction Date: [id number];     Tetracyclines & Related Hives     Reaction Date: [id number];     Vicks Babyrub  [Liniments]     Latex Rash     Reaction Date: 31Aug2010;        Current Outpatient Medications on File Prior to Visit   Medication Sig Dispense Refill    calcium carbonate (CALCIUM 600) 600 MG tablet Take by mouth 2 (two) times a day      multivitamin (THERAGRAN) TABS Take by mouth daily      rizatriptan (MAXALT-MLT) 5 MG disintegrating tablet Take 1 tablet (5 mg total) by mouth once as needed for migraine for up to 1 dose May repeat in 2 hours if needed 9 tablet 0     No current facility-administered medications on file prior to visit  No past medical history on file  Past Surgical History:   Procedure Laterality Date    KNEE SURGERY      TONSILECTOMY AND ADNOIDECTOMY            reports that she has never smoked  She has never used smokeless tobacco  She reports current alcohol use  She reports that she does not use drugs  reports that she has never smoked  She has never used smokeless tobacco         Review of Systems   Constitutional: Negative for activity change, chills and fever  HENT: Positive for ear pain  Negative for dental problem, hearing loss, sinus pain, sore throat and trouble swallowing  Eyes: Negative for photophobia, pain and visual disturbance  Field cut then waves  Blind spot  Respiratory: Negative for apnea, cough, chest tightness, shortness of breath, wheezing and stridor  Cardiovascular: Negative for chest pain  Gastrointestinal: Negative for abdominal distention, blood in stool and rectal pain  Endocrine: Negative for cold intolerance and polydipsia  Genitourinary: Negative for flank pain and urgency  Musculoskeletal: Negative for arthralgias, gait problem and neck pain  Skin: Negative for color change and rash  Neurological: Negative for dizziness, tremors, seizures, facial asymmetry, weakness, light-headedness, numbness and headaches  Hematological: Negative for adenopathy     Psychiatric/Behavioral: Negative for agitation, behavioral problems, confusion, self-injury, sleep disturbance and suicidal ideas  All other systems reviewed and are negative  Physical Exam  Constitutional:       Appearance: She is well-developed  HENT:      Head: Normocephalic and atraumatic  Right Ear: There is impacted cerumen  Left Ear: There is impacted cerumen  Eyes:      General: No scleral icterus  Conjunctiva/sclera: Conjunctivae normal       Pupils: Pupils are equal, round, and reactive to light  Neck:      Thyroid: No thyromegaly  Vascular: No JVD  Cardiovascular:      Rate and Rhythm: Normal rate and regular rhythm  Heart sounds: No murmur heard  Pulmonary:      Effort: Pulmonary effort is normal       Breath sounds: Normal breath sounds  No stridor  Abdominal:      General: There is no distension  Tenderness: There is no guarding  Musculoskeletal:         General: No tenderness or deformity  Normal range of motion  Lymphadenopathy:      Cervical: No cervical adenopathy  Skin:     Capillary Refill: Capillary refill takes less than 2 seconds  Findings: No erythema  Neurological:      Cranial Nerves: No cranial nerve deficit  Psychiatric:         Behavior: Behavior normal          Thought Content: Thought content normal          Judgment: Judgment normal            Ear cerumen removal    Date/Time: 9/13/2021 4:35 PM  Performed by: Mario Zabala MD  Authorized by: Mario Zabala MD   Universal Protocol:  Consent: Verbal consent obtained    Risks and benefits: risks, benefits and alternatives were discussed  Consent given by: patient  Patient understanding: patient states understanding of the procedure being performed  Patient consent: the patient's understanding of the procedure matches consent given      Patient location:  Clinic  Procedure details:     Local anesthetic:  None    Location:  L ear    Procedure type: irrigation with instrumentation      Instrumentation: curette      Approach:  Natural orifice  Post-procedure details:     Complication:  None    Patient tolerance of procedure:   Tolerated well, no immediate complications  Comments:      Use Debrox for weekly prevention follow up if any pain, bleeding redness or swelling

## 2021-09-13 NOTE — PATIENT INSTRUCTIONS
Use debrox 3 days per week as needed to prevent ear wax     Stay current with screenings and vaccinations labs low fat diet consider statin if LDL elevated or Red yeast Rice 600mg daily PM

## 2021-09-17 ENCOUNTER — TELEPHONE (OUTPATIENT)
Dept: OBGYN CLINIC | Facility: CLINIC | Age: 71
End: 2021-09-17

## 2021-09-17 NOTE — TELEPHONE ENCOUNTER
Patient is calling regarding recent mammogram results  Followup was going to be based on those results per your note

## 2021-09-20 NOTE — TELEPHONE ENCOUNTER
Patient's mammogram was benign  Would need to f/u with breast specialist if she wishes ABUS as insurance may not cover otherwise  Let me know if she needs a referral entered  Thanks!

## 2021-09-25 ENCOUNTER — IMMUNIZATIONS (OUTPATIENT)
Dept: FAMILY MEDICINE CLINIC | Facility: HOSPITAL | Age: 71
End: 2021-09-25

## 2021-09-25 DIAGNOSIS — Z23 ENCOUNTER FOR IMMUNIZATION: Primary | ICD-10-CM

## 2021-09-25 PROCEDURE — 91300 SARS-COV-2 / COVID-19 MRNA VACCINE (PFIZER-BIONTECH) 30 MCG: CPT

## 2021-09-25 PROCEDURE — 0001A SARS-COV-2 / COVID-19 MRNA VACCINE (PFIZER-BIONTECH) 30 MCG: CPT

## 2022-04-01 ENCOUNTER — OFFICE VISIT (OUTPATIENT)
Dept: FAMILY MEDICINE CLINIC | Facility: CLINIC | Age: 72
End: 2022-04-01
Payer: COMMERCIAL

## 2022-04-01 ENCOUNTER — APPOINTMENT (OUTPATIENT)
Dept: LAB | Facility: CLINIC | Age: 72
End: 2022-04-01
Payer: COMMERCIAL

## 2022-04-01 VITALS
BODY MASS INDEX: 24.83 KG/M2 | HEIGHT: 65 IN | HEART RATE: 71 BPM | TEMPERATURE: 97.6 F | DIASTOLIC BLOOD PRESSURE: 78 MMHG | OXYGEN SATURATION: 99 % | SYSTOLIC BLOOD PRESSURE: 142 MMHG | WEIGHT: 149 LBS | RESPIRATION RATE: 16 BRPM

## 2022-04-01 DIAGNOSIS — R76.8 POSITIVE HEPATITIS C ANTIBODY TEST: ICD-10-CM

## 2022-04-01 DIAGNOSIS — Z12.12 SCREENING FOR COLORECTAL CANCER: ICD-10-CM

## 2022-04-01 DIAGNOSIS — Z13.6 SCREENING FOR CARDIOVASCULAR CONDITION: ICD-10-CM

## 2022-04-01 DIAGNOSIS — Z13.0 SCREENING FOR DEFICIENCY ANEMIA: ICD-10-CM

## 2022-04-01 DIAGNOSIS — Z00.00 INITIAL MEDICARE ANNUAL WELLNESS VISIT: Primary | ICD-10-CM

## 2022-04-01 DIAGNOSIS — Z79.899 ENCOUNTER FOR LONG-TERM CURRENT USE OF MEDICATION: ICD-10-CM

## 2022-04-01 DIAGNOSIS — M81.0 OSTEOPOROSIS, UNSPECIFIED OSTEOPOROSIS TYPE, UNSPECIFIED PATHOLOGICAL FRACTURE PRESENCE: ICD-10-CM

## 2022-04-01 DIAGNOSIS — Z12.11 SCREENING FOR COLORECTAL CANCER: ICD-10-CM

## 2022-04-01 DIAGNOSIS — M54.50 ACUTE MIDLINE LOW BACK PAIN WITHOUT SCIATICA: ICD-10-CM

## 2022-04-01 LAB
25(OH)D3 SERPL-MCNC: 32.1 NG/ML (ref 30–100)
ALBUMIN SERPL BCP-MCNC: 4.1 G/DL (ref 3.5–5)
ALP SERPL-CCNC: 84 U/L (ref 46–116)
ALT SERPL W P-5'-P-CCNC: 26 U/L (ref 12–78)
ANION GAP SERPL CALCULATED.3IONS-SCNC: 2 MMOL/L (ref 4–13)
AST SERPL W P-5'-P-CCNC: 21 U/L (ref 5–45)
BILIRUB SERPL-MCNC: 0.47 MG/DL (ref 0.2–1)
BUN SERPL-MCNC: 18 MG/DL (ref 5–25)
CALCIUM SERPL-MCNC: 9.3 MG/DL (ref 8.3–10.1)
CHLORIDE SERPL-SCNC: 109 MMOL/L (ref 100–108)
CHOLEST SERPL-MCNC: 273 MG/DL
CO2 SERPL-SCNC: 31 MMOL/L (ref 21–32)
CREAT SERPL-MCNC: 0.73 MG/DL (ref 0.6–1.3)
ERYTHROCYTE [DISTWIDTH] IN BLOOD BY AUTOMATED COUNT: 12.8 % (ref 11.6–15.1)
GFR SERPL CREATININE-BSD FRML MDRD: 82 ML/MIN/1.73SQ M
GLUCOSE P FAST SERPL-MCNC: 97 MG/DL (ref 65–99)
HCT VFR BLD AUTO: 39.9 % (ref 34.8–46.1)
HDLC SERPL-MCNC: 97 MG/DL
HGB BLD-MCNC: 13 G/DL (ref 11.5–15.4)
LDLC SERPL CALC-MCNC: 158 MG/DL (ref 0–100)
MAGNESIUM SERPL-MCNC: 2.4 MG/DL (ref 1.6–2.6)
MCH RBC QN AUTO: 30.8 PG (ref 26.8–34.3)
MCHC RBC AUTO-ENTMCNC: 32.6 G/DL (ref 31.4–37.4)
MCV RBC AUTO: 95 FL (ref 82–98)
PLATELET # BLD AUTO: 141 THOUSANDS/UL (ref 149–390)
PMV BLD AUTO: 12.1 FL (ref 8.9–12.7)
POTASSIUM SERPL-SCNC: 4 MMOL/L (ref 3.5–5.3)
PROT SERPL-MCNC: 6.9 G/DL (ref 6.4–8.2)
RBC # BLD AUTO: 4.22 MILLION/UL (ref 3.81–5.12)
SODIUM SERPL-SCNC: 142 MMOL/L (ref 136–145)
TRIGL SERPL-MCNC: 92 MG/DL
VIT B12 SERPL-MCNC: 424 PG/ML (ref 100–900)
WBC # BLD AUTO: 4.02 THOUSAND/UL (ref 4.31–10.16)

## 2022-04-01 PROCEDURE — 83735 ASSAY OF MAGNESIUM: CPT

## 2022-04-01 PROCEDURE — 1170F FXNL STATUS ASSESSED: CPT | Performed by: FAMILY MEDICINE

## 2022-04-01 PROCEDURE — 3725F SCREEN DEPRESSION PERFORMED: CPT | Performed by: FAMILY MEDICINE

## 2022-04-01 PROCEDURE — 82306 VITAMIN D 25 HYDROXY: CPT

## 2022-04-01 PROCEDURE — 36415 COLL VENOUS BLD VENIPUNCTURE: CPT

## 2022-04-01 PROCEDURE — 1160F RVW MEDS BY RX/DR IN RCRD: CPT | Performed by: FAMILY MEDICINE

## 2022-04-01 PROCEDURE — 80061 LIPID PANEL: CPT

## 2022-04-01 PROCEDURE — 1125F AMNT PAIN NOTED PAIN PRSNT: CPT | Performed by: FAMILY MEDICINE

## 2022-04-01 PROCEDURE — 80053 COMPREHEN METABOLIC PANEL: CPT

## 2022-04-01 PROCEDURE — G0438 PPPS, INITIAL VISIT: HCPCS | Performed by: FAMILY MEDICINE

## 2022-04-01 PROCEDURE — 3008F BODY MASS INDEX DOCD: CPT | Performed by: FAMILY MEDICINE

## 2022-04-01 PROCEDURE — 85027 COMPLETE CBC AUTOMATED: CPT

## 2022-04-01 PROCEDURE — 82607 VITAMIN B-12: CPT

## 2022-04-01 PROCEDURE — 1036F TOBACCO NON-USER: CPT | Performed by: FAMILY MEDICINE

## 2022-04-01 PROCEDURE — 87522 HEPATITIS C REVRS TRNSCRPJ: CPT

## 2022-04-01 PROCEDURE — 1090F PRES/ABSN URINE INCON ASSESS: CPT | Performed by: FAMILY MEDICINE

## 2022-04-01 NOTE — PROGRESS NOTES
Assessment and Plan:     Problem List Items Addressed This Visit     Osteoporosis     Last DEXA done a year ago  Following with rheumatologist          Relevant Orders    Vitamin D 25 hydroxy      Other Visit Diagnoses     Initial Medicare annual wellness visit    -  Primary    Screening for colorectal cancer        Relevant Orders    Cologuard    Acute midline low back pain without sciatica        reviewed X-ray done in ER    Relevant Orders    Ambulatory referral to Physical Therapy    Screening for cardiovascular condition        Relevant Orders    Comprehensive metabolic panel    Lipid Panel with Direct LDL reflex    Screening for deficiency anemia        Relevant Orders    CBC    Positive hepatitis C antibody test        Relevant Orders    Hepatitis C RNA, quantitative, PCR    Encounter for long-term current use of medication        Relevant Orders    Magnesium    Vitamin B12       declined pneumonia vaccine     Return in about 1 year (around 4/1/2023) for Annual Wellness Visit  Preventive health issues were discussed with patient, and age appropriate screening tests were ordered as noted in patient's After Visit Summary  Personalized health advice and appropriate referrals for health education or preventive services given if needed, as noted in patient's After Visit Summary  History of Present Illness:     Patient presents for Medicare Annual Wellness visit      She felt something pop in her back when she was in the car driving to [place] trying to get her coat from behind her  She had to go to an x-ray in [place]  The pain lasted for a about a week  She was able to play golf 3 times and was fine  Then she really swung at the ball and the had more pain  She took Advil for the pain and that helped        Patient Care Team:  Anjelica Gonsalez DO as PCP - General (Family Medicine)  MD Cisco Munson MD Sharene Evener, MD     Problem List:     Patient Active Problem List   Diagnosis  Actinic keratosis    Arthritis    Chondromalacia of left patella    Chronic rhinitis    Diverticulitis of colon    Hepatic disorder    Osteoporosis    Bilateral impacted cerumen      Past Medical and Surgical History:     History reviewed  No pertinent past medical history    Past Surgical History:   Procedure Laterality Date    KNEE SURGERY      TONSILECTOMY AND ADNOIDECTOMY        Family History:     Family History   Problem Relation Age of Onset    Bone cancer Mother 62    Stomach cancer Mother     Coronary artery disease Father     No Known Problems Sister     Stomach cancer Maternal Grandmother     No Known Problems Maternal Grandfather     No Known Problems Paternal Grandmother     No Known Problems Paternal Grandfather     Cancer Maternal Aunt     No Known Problems Maternal Aunt     No Known Problems Maternal Aunt     No Known Problems Maternal Aunt     No Known Problems Paternal Aunt     No Known Problems Paternal Aunt     No Known Problems Paternal Aunt       Social History:     Social History     Socioeconomic History    Marital status: /Civil Union     Spouse name: None    Number of children: None    Years of education: None    Highest education level: None   Occupational History    None   Tobacco Use    Smoking status: Former Smoker    Smokeless tobacco: Never Used   Vaping Use    Vaping Use: Never used   Substance and Sexual Activity    Alcohol use: Yes     Comment: social    Drug use: No    Sexual activity: Yes   Other Topics Concern    None   Social History Narrative    None     Social Determinants of Health     Financial Resource Strain: Not on file   Food Insecurity: Not on file   Transportation Needs: Not on file   Physical Activity: Not on file   Stress: Not on file   Social Connections: Not on file   Intimate Partner Violence: Not on file   Housing Stability: Not on file      Medications and Allergies:     Current Outpatient Medications   Medication Sig Dispense Refill    calcium carbonate (CALCIUM 600) 600 MG tablet Take by mouth 2 (two) times a day      multivitamin (THERAGRAN) TABS Take by mouth daily       No current facility-administered medications for this visit  Allergies   Allergen Reactions    Camphor Hives    Blistex Medicated [Lip Medex]     Penicillins Hives     Reaction Date: [id number];     Shellfish Allergy - Food Allergy      Reaction Date: [id number];     Tetracyclines & Related Hives     Reaction Date: [id number];     Vicks Babyrub  [Liniments]     Latex Rash     Reaction Date: [id number];       Immunizations:     Immunization History   Administered Date(s) Administered    COVID-19 PFIZER VACCINE 0 3 ML IM 02/21/2021, 03/14/2021, 09/25/2021      Health Maintenance:         Topic Date Due    Colorectal Cancer Screening  Never done    Breast Cancer Screening: Mammogram  08/19/2022    Hepatitis C Screening  Completed         Topic Date Due    DTaP,Tdap,and Td Vaccines (1 - Tdap) Never done    Pneumococcal Vaccine: 65+ Years (1 of 1 - PPSV23) Never done    Influenza Vaccine (1) Never done      Medicare Health Risk Assessment:     /78   Pulse 71   Temp 97 6 °F (36 4 °C)   Resp 16   Ht 5' 5" (1 651 m)   Wt 67 6 kg (149 lb)   SpO2 99%   BMI 24 79 kg/m²      Kayce Michaels is here for her Initial Wellness visit  Health Risk Assessment:   Patient rates overall health as good  Patient feels that their physical health rating is slightly worse  Patient is satisfied with their life  Eyesight was rated as slightly worse  Hearing was rated as same  Patient feels that their emotional and mental health rating is same  Patients states they are never, rarely angry  Patient states they are never, rarely unusually tired/fatigued  Pain experienced in the last 7 days has been some  Patient's pain rating has been 2/10  Patient states that she has experienced no weight loss or gain in last 6 months       Depression Screening:   PHQ-2 Score: 0      Fall Risk Screening: In the past year, patient has experienced: no history of falling in past year      Urinary Incontinence Screening:   Patient has not leaked urine accidently in the last six months  Home Safety:  Patient does not have trouble with stairs inside or outside of their home  Patient has working smoke alarms and has working carbon monoxide detector  Home safety hazards include: none  Nutrition:   Current diet is Regular  Medications:   Patient is currently taking over-the-counter supplements  OTC medications include: see medication list  Patient is able to manage medications  Activities of Daily Living (ADLs)/Instrumental Activities of Daily Living (IADLs):   Walk and transfer into and out of bed and chair?: Yes  Dress and groom yourself?: Yes    Bathe or shower yourself?: Yes    Feed yourself?  Yes  Do your laundry/housekeeping?: Yes  Manage your money, pay your bills and track your expenses?: Yes  Make your own meals?: Yes    Do your own shopping?: Yes    Previous Hospitalizations:   Any hospitalizations or ED visits within the last 12 months?: Yes    How many hospitalizations have you had in the last year?: 1-2    Advance Care Planning:   Living will: No    Advanced directive counseling given: Yes    End of Life Decisions reviewed with patient: Yes    Provider agrees with end of life decisions: Yes      Cognitive Screening:   Provider or family/friend/caregiver concerned regarding cognition?: No    PREVENTIVE SCREENINGS      Cardiovascular Screening:    General: Screening Not Indicated and History Lipid Disorder      Colorectal Cancer Screening:     General: Risks and Benefits Discussed    Due for: Cologuard      Breast Cancer Screening:     General: Screening Current      Cervical Cancer Screening:    General: Screening Not Indicated      Osteoporosis Screening:    General: Screening Not Indicated and History Osteoporosis      Abdominal Aortic Aneurysm (AAA) Screening: General: Screening Not Indicated      Lung Cancer Screening:     General: Screening Not Indicated      Hepatitis C Screening:    General: Screening Current    Screening, Brief Intervention, and Referral to Treatment (SBIRT)    Screening  Typical number of drinks in a day: 0  Typical number of drinks in a week: 2  Interpretation: Low risk drinking behavior  AUDIT-C Screenin) How often did you have a drink containing alcohol in the past year? 2 to 4 times a month  2) How many drinks did you have on a typical day when you were drinking in the past year? 1 to 2  3) How often did you have 6 or more drinks on one occasion in the past year? never    AUDIT-C Score: 2  Interpretation: Score 0-2 (female): Negative screen for alcohol misuse    Single Item Drug Screening:  How often have you used an illegal drug (including marijuana) or a prescription medication for non-medical reasons in the past year? never    Single Item Drug Screen Score: 0  Interpretation: Negative screen for possible drug use disorder    Brief Intervention  Alcohol & drug use screenings were reviewed  No concerns regarding substance use disorder identified  Physical Exam  Vitals and nursing note reviewed  Constitutional:       Appearance: She is well-developed  HENT:      Head: Normocephalic and atraumatic  Right Ear: External ear normal       Left Ear: External ear normal       Nose: Nose normal    Cardiovascular:      Rate and Rhythm: Normal rate and regular rhythm  Heart sounds: Normal heart sounds  No murmur heard  No friction rub  Pulmonary:      Effort: No respiratory distress  Breath sounds: Normal breath sounds  No wheezing or rales  Musculoskeletal:         General: Tenderness (slight tenderness superior aspect of sacrum) present  Right lower leg: No edema  Left lower leg: No edema  Neurological:      Mental Status: She is oriented to person, place, and time        Cranial Nerves: No cranial nerve deficit            Jackson Burnett, DO

## 2022-04-01 NOTE — PATIENT INSTRUCTIONS
Medicare Preventive Visit Patient Instructions  Thank you for completing your Welcome to Medicare Visit or Medicare Annual Wellness Visit today  Your next wellness visit will be due in one year (4/2/2023)  The screening/preventive services that you may require over the next 5-10 years are detailed below  Some tests may not apply to you based off risk factors and/or age  Screening tests ordered at today's visit but not completed yet may show as past due  Also, please note that scanned in results may not display below  Preventive Screenings:  Service Recommendations Previous Testing/Comments   Colorectal Cancer Screening  * Colonoscopy    * Fecal Occult Blood Test (FOBT)/Fecal Immunochemical Test (FIT)  * Fecal DNA/Cologuard Test  * Flexible Sigmoidoscopy Age: 54-65 years old   Colonoscopy: every 10 years (may be performed more frequently if at higher risk)  OR  FOBT/FIT: every 1 year  OR  Cologuard: every 3 years  OR  Sigmoidoscopy: every 5 years  Screening may be recommended earlier than age 48 if at higher risk for colorectal cancer  Also, an individualized decision between you and your healthcare provider will decide whether screening between the ages of 74-80 would be appropriate  Colonoscopy: Not on file  FOBT/FIT: Not on file  Cologuard: Not on file  Sigmoidoscopy: Not on file          Breast Cancer Screening Age: 36 years old  Frequency: every 1-2 years  Not required if history of left and right mastectomy Mammogram: 08/19/2021    Screening Current   Cervical Cancer Screening Between the ages of 21-29, pap smear recommended once every 3 years  Between the ages of 33-67, can perform pap smear with HPV co-testing every 5 years     Recommendations may differ for women with a history of total hysterectomy, cervical cancer, or abnormal pap smears in past  Pap Smear: 01/27/2021    Screening Not Indicated   Hepatitis C Screening Once for adults born between 1945 and 1965  More frequently in patients at high risk for Hepatitis C Hep C Antibody: 01/21/2021    Screening Current   Diabetes Screening 1-2 times per year if you're at risk for diabetes or have pre-diabetes Fasting glucose: 99 mg/dL   A1C: No results in last 5 years        Cholesterol Screening Once every 5 years if you don't have a lipid disorder  May order more often based on risk factors  Lipid panel: 01/21/2021    Screening Not Indicated  History Lipid Disorder     Other Preventive Screenings Covered by Medicare:  1  Abdominal Aortic Aneurysm (AAA) Screening: covered once if your at risk  You're considered to be at risk if you have a family history of AAA  2  Lung Cancer Screening: covers low dose CT scan once per year if you meet all of the following conditions: (1) Age 50-69; (2) No signs or symptoms of lung cancer; (3) Current smoker or have quit smoking within the last 15 years; (4) You have a tobacco smoking history of at least 30 pack years (packs per day multiplied by number of years you smoked); (5) You get a written order from a healthcare provider  3  Glaucoma Screening: covered annually if you're considered high risk: (1) You have diabetes OR (2) Family history of glaucoma OR (3)  aged 48 and older OR (3)  American aged 72 and older  3  Osteoporosis Screening: covered every 2 years if you meet one of the following conditions: (1) You're estrogen deficient and at risk for osteoporosis based off medical history and other findings; (2) Have a vertebral abnormality; (3) On glucocorticoid therapy for more than 3 months; (4) Have primary hyperparathyroidism; (5) On osteoporosis medications and need to assess response to drug therapy  · Last bone density test (DXA Scan): 04/26/2021   5  HIV Screening: covered annually if you're between the age of 15-65  Also covered annually if you are younger than 13 and older than 72 with risk factors for HIV infection   For pregnant patients, it is covered up to 3 times per pregnancy  Immunizations:  Immunization Recommendations   Influenza Vaccine Annual influenza vaccination during flu season is recommended for all persons aged >= 6 months who do not have contraindications   Pneumococcal Vaccine (Prevnar and Pneumovax)  * Prevnar = PCV13  * Pneumovax = PPSV23   Adults 25-60 years old: 1-3 doses may be recommended based on certain risk factors  Adults 72 years old: Prevnar (PCV13) vaccine recommended followed by Pneumovax (PPSV23) vaccine  If already received PPSV23 since turning 65, then PCV13 recommended at least one year after PPSV23 dose  Hepatitis B Vaccine 3 dose series if at intermediate or high risk (ex: diabetes, end stage renal disease, liver disease)   Tetanus (Td) Vaccine - COST NOT COVERED BY MEDICARE PART B Following completion of primary series, a booster dose should be given every 10 years to maintain immunity against tetanus  Td may also be given as tetanus wound prophylaxis  Tdap Vaccine - COST NOT COVERED BY MEDICARE PART B Recommended at least once for all adults  For pregnant patients, recommended with each pregnancy  Shingles Vaccine (Shingrix) - COST NOT COVERED BY MEDICARE PART B  2 shot series recommended in those aged 48 and above     Health Maintenance Due:      Topic Date Due    Colorectal Cancer Screening  Never done    Breast Cancer Screening: Mammogram  08/19/2022    Hepatitis C Screening  Completed     Immunizations Due:      Topic Date Due    DTaP,Tdap,and Td Vaccines (1 - Tdap) Never done    Pneumococcal Vaccine: 65+ Years (1 of 1 - PPSV23) Never done    Influenza Vaccine (1) Never done     Advance Directives   What are advance directives? Advance directives are legal documents that state your wishes and plans for medical care  These plans are made ahead of time in case you lose your ability to make decisions for yourself   Advance directives can apply to any medical decision, such as the treatments you want, and if you want to donate organs  What are the types of advance directives? There are many types of advance directives, and each state has rules about how to use them  You may choose a combination of any of the following:  · Living will: This is a written record of the treatment you want  You can also choose which treatments you do not want, which to limit, and which to stop at a certain time  This includes surgery, medicine, IV fluid, and tube feedings  · Durable power of  for healthcare [facility]): This is a written record that states who you want to make healthcare choices for you when you are unable to make them for yourself  This person, called a proxy, is usually a family member or a friend  You may choose more than 1 proxy  · Do not resuscitate (DNR) order:  A DNR order is used in case your heart stops beating or you stop breathing  It is a request not to have certain forms of treatment, such as CPR  A DNR order may be included in other types of advance directives  · Medical directive: This covers the care that you want if you are in a coma, near death, or unable to make decisions for yourself  You can list the treatments you want for each condition  Treatment may include pain medicine, surgery, blood transfusions, dialysis, IV or tube feedings, and a ventilator (breathing machine)  · Values history: This document has questions about your views, beliefs, and how you feel and think about life  This information can help others choose the care that you would choose  Why are advance directives important? An advance directive helps you control your care  Although spoken wishes may be used, it is better to have your wishes written down  Spoken wishes can be misunderstood, or not followed  Treatments may be given even if you do not want them  An advance directive may make it easier for your family to make difficult choices about your care        © Copyright [employer] 2018 Information is for End User's use only and may not be sold, redistributed or otherwise used for commercial purposes   All illustrations and images included in CareNotes® are the copyrighted property of [employer]  [facility] [address]

## 2022-04-05 ENCOUNTER — TELEPHONE (OUTPATIENT)
Dept: FAMILY MEDICINE CLINIC | Facility: CLINIC | Age: 72
End: 2022-04-05

## 2022-04-05 DIAGNOSIS — Z13.6 SCREENING FOR CARDIOVASCULAR CONDITION: ICD-10-CM

## 2022-04-05 DIAGNOSIS — E78.2 MIXED HYPERLIPIDEMIA: Primary | ICD-10-CM

## 2022-04-05 LAB
HCV RNA SERPL NAA+PROBE-ACNC: NORMAL IU/ML
TEST INFORMATION: NORMAL

## 2022-04-05 NOTE — TELEPHONE ENCOUNTER
4/5/2022 9:25 AM Called Anatoliy Garcia to discuss her lab results  I let her know that her hepatitis C test was negative  We also discussed her elevated cholesterol  We discussed her 10 year ASCVD risk score  She is hesitant to start medication because she is concerned about side effects  She agreed to get a CT coronary calcium score to help us determine her underlying plaques  After CT coronary calcium score test will discuss the need for medication pending the results      Jerrell Mckenzie DO

## 2022-04-08 ENCOUNTER — EVALUATION (OUTPATIENT)
Dept: PHYSICAL THERAPY | Facility: CLINIC | Age: 72
End: 2022-04-08
Payer: COMMERCIAL

## 2022-04-08 DIAGNOSIS — M54.50 ACUTE MIDLINE LOW BACK PAIN WITHOUT SCIATICA: Primary | ICD-10-CM

## 2022-04-08 PROCEDURE — 97161 PT EVAL LOW COMPLEX 20 MIN: CPT

## 2022-04-08 NOTE — PROGRESS NOTES
PT Evaluation     Today's date: 2022  Patient name: Jon Salazar  : 1950  MRN: [id number]  Referring provider: Lacy Ndiaye DO  Dx:   Encounter Diagnosis     ICD-10-CM    1  Acute midline low back pain without sciatica  M54 49 Ambulatory referral to Physical Therapy    reviewed X-ray done in ER                  Assessment  Assessment details:   CASE SUMMARY:   Jon Salazar is a 66y o  year old female who presents to OPPT upon referral from primary secondary to c/o low back pain  Najma Dumont reports onset of symptoms ~  2022 as a result of taking off her coat with seatbelt on, was arching back and attempting to pull coat off, immediately felt a pop, was riding in car to [place]  Immediate pain across low back and buttocks  Pain was worst with all bending forward  Went to ER   For a week had difficulty walking due to pain 3 weeks later she was  Swinging golf club full turn and felt a grab in her central area of SI  End of February  symptoms resolved quicker  patient describes  current symptoms as: an awareness of central low back and is afraid to swing golf club  No report of pain: last experience of pain  week of march  Symptoms are : intermittent  Pain level:  Current: 1/10  At Best: 0/10   At worst: 1/10 and with ADL's 1/10  Symptoms improve with: movement, and worsen with sitting still  Overall symptom progression at this time is improving  Previous injury to this area: sciatica intermittently over past years  Previous treatment includes: none for this episode  Diagnostic testing includes: Xray   PMHx includes: See chart for full details with medications, allergies, past family history, past medical history, social history, past surgical history and problem list  All topics were reviewed  Functionally, at baseline, Najma Dumont is independent with all basic ADL's and  advanced ADL's    Currently, Najma Dumont is limited in the following activities: avoiding lifting heavy items, not playing golf,   Belem Bailey is lives   Recreational activities include: golf ( 3-4 x week)   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  Patient goal(s) for physical therapy is: to strengthen the muscle in back and abs to avoid future injury,       The following is a summary of Belem Bailey objective status:    Impairments:   Postural dysfunction  Reduced ROM: lumbar spine  Reduced strength  + TTP : right PSIS  Reduced flexibility: hip flexors  Reduced activity tolerance including above stated functional limitations    Patient's clinical presentation is consistent with their referring diagnosis of: Acute midline low back pain without sciatica  (primary encounter diagnosis)  Comment: reviewed X-ray done in ER  Plan: Ambulatory referral to Physical Therapy    Patient presents with minimal residual symptoms from previous incident In January then again in February  Patient history of osteoporosis and has a finding on xray of anterior positioned L4  Patient demonstrated fair TA, reduced hip mobility, bridge dysfunction, and reduced lumbar extension ROM  Repeated testing not performed this session due to resolving pain and history of osteoporosis and forward L4  Focus of treatment will be to inc lumbar stability, improve hip strength and overall function  PLOC was discussed and agreed upon with patient  Patient was educated on: [facility] and TA/pelvic tilts foundation of [facility] stabiity   Patient vocalized a good understanding of  PLOC and HEP issued  Belem Bailey would benefit from skilled physical therapy services to address their aforementioned functional limitations and progress towards prior level of function, to meet stated goals,  and independence with home exercise program   Prognosis for successful rehab outcome is good with consistent participation in therapy and carryover of HEP and PLOC  FOTO score is 54% with a 72% prediction in function       Functional limitations: see subjectiveUnderstanding of Dx/Px/POC: good   Prognosis: good    Goals  STG  + Patient will have pain level 0/10 lumbar spine  at rest (2-3 weeks)  + Patient will report a 40% improvement in symptoms (2-3 weeks)   + Patient will be independent in basic HEP (2-3 weeks)  + Patient will perform proper breathing technique and therefore, appropriate abdominal recruitment independently (2-3 weeks)  + Patient will report being able to perform short and mid distance swing during golf wihout pain(2-4 weeks)    LTG:  + Patient will have pain level 0/10 lumbar spine  with ADL's (4-6 weeks)  + Patient will report a 80% improvement in symptoms (4-6 weeks)   + Patient will increase FOTO score by 10 points (8 sessions)   + Patient will be independent in comprehensive HEP (4-6 weeks)  + Patient will demonstrate increase  MMT of  hip to  4/5 for maximum function  (4-6 weeks)  + Patient will report no functional limitaitons (4-6 weeks)    Plan  Plan details: 2-3 x week, 4-6 weeks: HEP development, stretching as needed per objective findings, strengthening core/lower quadrant, A/AA/PROM lumbar/hip motions, joint mobilizations prn, posture education, STM/MI as needed to reduce muscle tension per objective findings, muscle reeducation per objective findings, dynamic stabilization, PLOC discussed and agreed upon with patient    Patient would benefit from: skilled physical therapy  Frequency: 2x week  Plan of Care beginning date: 4/8/2022  Plan of Care expiration date: 5/20/2022  Treatment plan discussed with: patient        Subjective    Objective     Static Posture     Comments  Posture   Sitting: fair: + forward head, PPT   Standing: reduced lumbar lordosis, left shoulder min higher then right     Gait: no assistive device    Palpation: + TTP in area of PSIS    Functional movements   Squat: unable to tap standard chair with immediate return to stand, inc hip IR yovani   (-) pain   Bridging: able to lift 90 % of motion, right pelvic drop (-) pain  Transfers sit/stand: independent    Dermatomes: wnl to light touch    Myotomes   L2 (hip flexion): Right 4+/5  Left: 4+/5   L3 (knee extension): Right: 4+/5 Left: 4+/5   L4 (ankle dorsiflexion): Right: 4+/5 Left: 4+/5   L5 (hallux extension): Right: 4+/5  Left: 4+/5   S1 (ankle plantarflexion): Right: 4+/5 Left: 4+/5  Left clamshell: left: 4-/5 + pain  Right: 4-/5 no pain      Neg leg length discrepancy in supine    Lumbar ROM    Flexion: wnl LOM    Extension: moderate LOM + central low back pressure, NWAR, prone press up: + "crunchy" in mid lumbar: no pain, min LOM,   Side bend: Right: wnl LOM (-) pain     Left: wnl LOM (-) pain        Repeated motions: NT due to osteoporosis and anterior subluxed L4    TA facilitation: level 1 TA    Flexibility   Hamstring: Right: good  Left: good   Hip flexors:Right: good  Left: fair       Hip ROM/MMT: prn                   Eval/ Re-eval Auth #/ Referral # Total visits Start date  Expiration date Total active units  Total manual units  PT only or  PT+OT? Date:           Total authorized units:  Active:            Manual:           Total remaining units:  Active:               Manual:                Date:           Total authorized units: Active:            Manual:           Total remaining units:  Active:               Manual:                  Precautions osteoporosis, anterior subluxation of L4      Specialty Daily Treatment Diary       Insurance :         Visits: 1       Manual: 4/8/22       STM                Man Flexibility: hamstring, hip flexolr, piriformis        Ther ex:        Protocol:         Rec bike/  nustep        Nae        LTR        Rob test stretch        Seated hamstring stretch        bridging        Sit/stand with belt                 MARYELLEN                                                Neuro He:         TA activation         + marching        +hip abd iso        +hip add iso + alternating LE extension        + SLR                                        Therapeutic Activity:         Reevaluation                Gait Training:                 HEP:                 Modalities        MH        ice        Total time:                  Access Code: [email or web address]  URL: [email or web address]  Date: 04/08/2022  Prepared by:  Mika Roberson    Exercises  · Supine Transversus Abdominis Bracing - Hands on Stomach - 1 x daily - 7 x weekly - 1 sets - 10 reps - 5 sec hold

## 2022-04-09 ENCOUNTER — IMMUNIZATIONS (OUTPATIENT)
Dept: FAMILY MEDICINE CLINIC | Facility: HOSPITAL | Age: 72
End: 2022-04-09

## 2022-04-09 PROCEDURE — 91305 COVID-19 PFIZER VACC TRIS-SUCROSE GRAY CAP 0.3 ML: CPT

## 2022-04-09 PROCEDURE — 0054A COVID-19 PFIZER VACC TRIS-SUCROSE GRAY CAP 0.3 ML: CPT

## 2022-04-12 ENCOUNTER — APPOINTMENT (OUTPATIENT)
Dept: PHYSICAL THERAPY | Facility: CLINIC | Age: 72
End: 2022-04-12
Payer: COMMERCIAL

## 2022-04-15 ENCOUNTER — OFFICE VISIT (OUTPATIENT)
Dept: PHYSICAL THERAPY | Facility: CLINIC | Age: 72
End: 2022-04-15
Payer: COMMERCIAL

## 2022-04-15 DIAGNOSIS — M54.50 ACUTE MIDLINE LOW BACK PAIN WITHOUT SCIATICA: Primary | ICD-10-CM

## 2022-04-15 PROCEDURE — 97110 THERAPEUTIC EXERCISES: CPT

## 2022-04-15 PROCEDURE — 97112 NEUROMUSCULAR REEDUCATION: CPT

## 2022-04-15 NOTE — PROGRESS NOTES
Daily Note         Today's date: 4/15/2022  Patient name: Jamel Moore  : 1950  MRN: [id number]  Referring provider: Hank Robert DO  Dx:   Encounter Diagnosis     ICD-10-CM    1  Acute midline low back pain without sciatica  M54 49        Subjective: Jamel Moore reports when she left last time her back was sore  States she felt it was from the extensions  Used a heating pad that evening: and resolved within 2 days  Pain was higher and not the same pain as what brought her to PT  Pain level past 2 days 0/10  Will play golf this afternoon  Played 2 days ago, and no symptoms  Objective: See treatment diary below    Assessment:  performed increased manual and verbal cuing with instructing in proper form with stabilization exercises  patient instructed in written exercises for proper form  inc pelvic rotation when lifting left LE during marching  patient needed manual and verbal cuing to avoid over recruitment of quads and hip adductors with glute set  Patient only able to lift ~ 80% of motion with bridging  Plan: progress stab as tolerated  Eval/ Re-eval Auth #/ Referral # Total visits Start date  Expiration date Total active units  Total manual units  PT only or  PT+OT?   22 Needs auth          4/15/22 PT auth: [id number]  4/8/22 10/5/22 99                                       Precautions osteoporosis, anterior subluxation of L4      Specialty Daily Treatment Diary       Insurance :         Visits: 1 2      Manual: 4/8/22 4/15/22      STM                Man Flexibility: hamstring, hip flexolr, piriformis        Ther ex:        Protocol:         Rec bike/  nustep        Nae MARCUS        Rob test stretch        Seated hamstring stretch  10 sec x 5        bridging        Sit/stand with belt         Lumbar rotation in hooklying  10-15 x each side       sideling thoracic rotation   Deferred                                               Neuro He: TA activation   5 sec x 10       + alternating UE flexion   2 x 10        + marching  10 x 2       + supported dying bug  2 x 10       +hip abd iso        +hip add iso        + alternating LE extension        + SLR        glute set  5 sec x 10        Bridging + glute set  10 x                      Therapeutic Activity:         Reevaluation                Gait Training:                 HEP:                 Modalities        MH        ice        Total time:                  Access Code: [email or web address]  URL: [email or web address]  Date: 04/08/2022  Prepared by:  Steven Ramos    Exercises  · Supine Transversus Abdominis Bracing - Hands on Stomach - 1 x daily - 7 x weekly - 1 sets - 10 reps - 5 sec hold

## 2022-04-18 ENCOUNTER — OFFICE VISIT (OUTPATIENT)
Dept: PHYSICAL THERAPY | Facility: CLINIC | Age: 72
End: 2022-04-18
Payer: COMMERCIAL

## 2022-04-18 DIAGNOSIS — M54.50 ACUTE MIDLINE LOW BACK PAIN WITHOUT SCIATICA: Primary | ICD-10-CM

## 2022-04-18 PROCEDURE — 97112 NEUROMUSCULAR REEDUCATION: CPT

## 2022-04-18 PROCEDURE — 97110 THERAPEUTIC EXERCISES: CPT

## 2022-04-18 NOTE — PROGRESS NOTES
Daily Note         Today's date: 2022  Patient name: Bonny Singleton  : 1950  MRN: [id number]  Referring provider: Isabel Jo DO  Dx:   Encounter Diagnosis     ICD-10-CM    1  Acute midline low back pain without sciatica  M54 49        Subjective: Bonny Singleton reports when had decreased soreness from last session  She reports no pain this session  Objective: See treatment diary below    Assessment: Patient tolerated progression with lumbar stabilization with no pain provocation  She required manual and verbal cueing to demonstrate proper TrA contraction to perform during stabilization exercises  She had good carryover of cues and was educated on incorporating into HEP  Improved thoracic and lumbar mobility noted during supine T/S and L/S rotation exercise  Plan to progress to standing core stabilization exericses  Plan: progress stab as tolerated  Eval/ Re-eval Auth #/ Referral # Total visits Start date  Expiration date Total active units  Total manual units  PT only or  PT+OT?   22 Needs auth          4/15/22 PT auth: [id number]  4/8/22 10/5/22 99                                         Noemi Reynaga #: [id number] Date 4/8 4/15 4/18            Units  Authed: 99 Used Eval 3 3             Remaining   96 93                Precautions osteoporosis, anterior subluxation of L4      Specialty Daily Treatment Diary       Insurance :         Visits: 1 2 3     Manual: 4/8/22 4/15/22 4/18/22     [facility]                Man Flexibility: hamstring, hip flexolr, piriformis        Ther ex:        Protocol:         Rec bike/  nustep        Nae MARCUS        Rob test stretch        Seated hamstring stretch  10 sec x 5        bridging        Sit/stand with belt         Lumbar rotation in hooklying  10-15 x each side  10-15x each     sideling thoracic rotation   Deferred  10x     Sidelying thoracic + lumbar rotation        Hamstring curl   Peanut  10x     Supine Unilateral Hip Abduction    Red tband  20x per side                     Neuro He:         TA activation   5 sec x 10  1 sec x 20     + alternating UE flexion   2 x 10   2 x 10     + marching  10 x 2  2 x 10      + supported dying bug  2 x 10       +hip abd iso        +hip add iso        + alternating LE extension        + SLR        glute set  5 sec x 10        Bridging + glute set  10 x 10 x                     Therapeutic Activity:         Reevaluation                Gait Training:                 HEP:                 Modalities        MH        ice        Total time:                  Access Code: [email or web address]  URL: [email or web address]  Date: 04/08/2022  Prepared by:  Isreal Cowing    Exercises  · Supine Transversus Abdominis Bracing - Hands on Stomach - 1 x daily - 7 x weekly - 1 sets - 10 reps - 5 sec hold  · Supine Transversus Abdominis Brace + Arms - 1 x daily - 7 x weekly - 1 set - 10 reps   · Supine Transversus Abdominis Brace + Posterior Pelvic Tilt + Bridge - 1 set - 7 x weekly - 1 set - 10 reps - 2-3 sec hold

## 2022-04-19 ENCOUNTER — APPOINTMENT (OUTPATIENT)
Dept: PHYSICAL THERAPY | Facility: CLINIC | Age: 72
End: 2022-04-19
Payer: COMMERCIAL

## 2022-04-21 ENCOUNTER — OFFICE VISIT (OUTPATIENT)
Dept: PHYSICAL THERAPY | Facility: CLINIC | Age: 72
End: 2022-04-21
Payer: COMMERCIAL

## 2022-04-21 DIAGNOSIS — M54.50 ACUTE MIDLINE LOW BACK PAIN WITHOUT SCIATICA: Primary | ICD-10-CM

## 2022-04-21 PROCEDURE — 97112 NEUROMUSCULAR REEDUCATION: CPT

## 2022-04-21 PROCEDURE — 97110 THERAPEUTIC EXERCISES: CPT

## 2022-04-21 NOTE — PROGRESS NOTES
Daily Note         Today's date: 2022  Patient name: Emanuel Jimenes  : 1950  MRN: [id number]  Referring provider: Jake Kanner, DO  Dx:   Encounter Diagnosis     ICD-10-CM    1  Acute midline low back pain without sciatica  M54 49        Subjective: Emanuel Jimenes reports no pain states she has insignficnat muscle soreness from golfing yesterday  Objective: See treatment diary below    Assessment:  progressed program to include strengthening this sesison  patient needed foam in chair for sit/stand squats to maintain proper form  patient overall dmeosntrated good tolerance to program   The goal of the current treatment is to address patient's functional limitations as well as objective findings  Plan: progress stabilizaiton and strengtheing program        Eval/ Re-eval Auth #/ Referral # Total visits Start date  Expiration date Total active units  Total manual units  PT only or  PT+OT?   22 Needs auth          4/15/22 PT auth: [id number]  4/8/22 10/5/22 99                                         Joseph Bridge #: [id number] Date 4/8 4/15 4/18 4/21        Units  Authed: 99 Used Eval 3 3 3         Remaining   96 93 90            Precautions osteoporosis, anterior subluxation of L4      Specialty Daily Treatment Diary       Insurance :         Visits: 1 2 3 4    Manual: 4/8/22 4/15/22 4/18/22 4/21/22    [facility]                Man Flexibility: hamstring, hip flexolr, piriformis        Ther ex:        Protocol:         Rec bike/  nustep        Nae MARCUS        Rob test stretch        Seated hamstring stretch  10 sec x 5    Supine 10 sec x 5      bridging    D/c    Sit/stand with belt         Lumbar rotation in hooklying  10-15 x each side  10-15x each     sideling thoracic rotation   Deferred  10x     Sidelying thoracic + lumbar rotation        Hamstring curl   Peanut  10x     Supine Unilateral Hip Abduction    Red tband  20x per side     Sidestepping with loop    xtra light 16 feet x 2 x 3 rounds            Neuro He:         TA activation   5 sec x 10  1 sec x 20 5 sec x 10     + alternating UE flexion   2 x 10   2 x 10     + marching  10 x 2  2 x 10              + supported dying bug  2 x 10   10 x each  +  iso hold at midline 5 sec x 10              +hip abd iso    + bridge: 5 sec x 10      +hip add iso    + bridge: 5 sec x 10      + alternating LE extension        + SLR        glute set  5 sec x 10        Bridging + glute set  10 x 10 x D/c     Prone with pillow under hips alt UE/LE lift      ++   Free squats    Standard chair + foam 10 x 2     Pall off press    Yellow (1) 5 sec x 10                              Therapeutic Activity:         Reevaluation                Gait Training:                 HEP:                 Modalities        MH        ice        Total time:                  Access Code: [email or web address]  URL: [email or web address]  Date: 04/08/2022  Prepared by:  Gregorio Perez    Exercises  · Supine Transversus Abdominis Bracing - Hands on Stomach - 1 x daily - 7 x weekly - 1 sets - 10 reps - 5 sec hold  · Supine Transversus Abdominis Brace + Arms - 1 x daily - 7 x weekly - 1 set - 10 reps   · Supine Transversus Abdominis Brace + Posterior Pelvic Tilt + Bridge - 1 set - 7 x weekly - 1 set - 10 reps - 2-3 sec hold

## 2022-04-22 ENCOUNTER — APPOINTMENT (OUTPATIENT)
Dept: PHYSICAL THERAPY | Facility: CLINIC | Age: 72
End: 2022-04-22
Payer: COMMERCIAL

## 2022-04-25 ENCOUNTER — OFFICE VISIT (OUTPATIENT)
Dept: PHYSICAL THERAPY | Facility: CLINIC | Age: 72
End: 2022-04-25
Payer: COMMERCIAL

## 2022-04-25 DIAGNOSIS — M54.50 ACUTE MIDLINE LOW BACK PAIN WITHOUT SCIATICA: Primary | ICD-10-CM

## 2022-04-25 PROCEDURE — 97112 NEUROMUSCULAR REEDUCATION: CPT

## 2022-04-25 PROCEDURE — 97110 THERAPEUTIC EXERCISES: CPT

## 2022-04-25 NOTE — PROGRESS NOTES
Daily Note         Today's date: 2022  Patient name: Librado De Leon  : 1950  MRN: [id number]  Referring provider: Jeanette Strauss DO  Dx:   Encounter Diagnosis     ICD-10-CM    1  Acute midline low back pain without sciatica  M54 49        Subjective: Librado De Leon reports she played golf yesterday and played well  Pain level 0/10  Objective: See treatment diary below      Assessment: Patient able to tolerate treatment well today with no pain throughout session  Progressed core exercises with dynamic anti-rotation and no compensatory pelvic or trunk rotation  Discussed with patient addition of exercises to HEP next visit and she was in good verbal agreement and understanding  Plan: Potential d/c nv pending progress and progression from primary PT  Eval/ Re-eval Auth #/ Referral # Total visits Start date  Expiration date Total active units  Total manual units  PT only or  PT+OT?   22 Needs auth          4/15/22 PT [address]: #[id number]  4/8/22 10/5/22 99                                         Cedric Pressley #: [id number] Date 4/8 4/15 4/18 4/21 4/25       Units  Authed: 99 Used Eval 3 3 3 3        Remaining   96 93 90 87           Precautions osteoporosis, anterior subluxation of L4      Specialty Daily Treatment Diary       Insurance :         Visits:   3 4 5   Manual:   22/                   Man Flexibility: hamstring, hip flexolr, piriformis        Ther ex:        Protocol:         Rec bike/  nustep        Nae MARCUS        Rob test stretch        Seated hamstring stretch    Supine 10 sec x 5   Supine 10 sec x 5     bridging    D/c    Sit/stand with belt         Lumbar rotation in hooklying   10-15x each     sideling thoracic rotation    10x     Sidelying thoracic + lumbar rotation        Hamstring curl   Peanut  10x  Peanut  10x   Supine Unilateral Hip Abduction    Red tband  20x per side     Sidestepping with loop    xtra light 16 feet x 2 x 3 rounds xtra light 16 feet x 2 x 3 rounds           Neuro He:         TA activation    1 sec x 20 5 sec x 10  5 sec x 10     + alternating UE flexion    2 x 10  2 x 10   + marching   2 x 10   2 x 10    + rib trick         + supported dying bug    10 x each  +  iso hold at midline 5 sec x 10   2x10, + 5" iso hold at mid line           +hip abd iso    + bridge: 5 sec x 10   + bridge, 5" x15   +hip add iso    + bridge: 5 sec x 10   + bridge, 5" x15   + alternating LE extension        + SLR        glute set        Bridging + glute set   10 x D/c     Prone with pillow under hips alt UE/LE lift      ++   Free squats    Standard chair + foam 10 x 2  Standard chair + foam 10 x 2    Pall off press    Yellow (1) 5 sec x 10   Yellow (1) 5 sec 2 x 10     Lat step w/ antirot (YTB)  1 x 10 B/L                           Therapeutic Activity:         Reevaluation                Gait Training:                 HEP:                 Modalities        MH        ice        Total time:                  Access Code: [email or web address]  URL: [email or web address]  Date: 04/08/2022  Prepared by:  Steven Ramos    Exercises  · Supine Transversus Abdominis Bracing - Hands on Stomach - 1 x daily - 7 x weekly - 1 sets - 10 reps - 5 sec hold  · Supine Transversus Abdominis Brace + Arms - 1 x daily - 7 x weekly - 1 set - 10 reps   · Supine Transversus Abdominis Brace + Posterior Pelvic Tilt + Bridge - 1 set - 7 x weekly - 1 set - 10 reps - 2-3 sec hold

## 2022-04-26 ENCOUNTER — APPOINTMENT (OUTPATIENT)
Dept: PHYSICAL THERAPY | Facility: CLINIC | Age: 72
End: 2022-04-26
Payer: COMMERCIAL

## 2022-04-28 ENCOUNTER — OFFICE VISIT (OUTPATIENT)
Dept: PHYSICAL THERAPY | Facility: CLINIC | Age: 72
End: 2022-04-28
Payer: COMMERCIAL

## 2022-04-28 DIAGNOSIS — M54.50 ACUTE MIDLINE LOW BACK PAIN WITHOUT SCIATICA: Primary | ICD-10-CM

## 2022-04-28 PROCEDURE — 97112 NEUROMUSCULAR REEDUCATION: CPT

## 2022-04-28 PROCEDURE — 97110 THERAPEUTIC EXERCISES: CPT

## 2022-04-28 NOTE — PROGRESS NOTES
Daily Note/Progress Note      Today's date: 2022  Patient name: Janel Jernigan  : 1950  MRN: [id number]  Referring provider: Brooke Carreon DO  Dx:   Encounter Diagnosis     ICD-10-CM    1  Acute midline low back pain without sciatica  M[age]        Subjective: Pt reports % improvement since SOC: 90%  Pain level at rest:  0 /10, pain level with ADLS:  0 /92, pain level at worst: 0/10   At this time, the functional limitations include: none, states she was taking a full swing in golf   States she did not enter with inc pain but feels she has the too+ls to improve strength and prevent further pain and golf in improving  Objective: See treatment diary below    Goals  STG  + Patient will have pain level 0/10 lumbar spine  at rest (2-3 weeks) met  + Patient will report a 40% improvement in symptoms (2-3 weeks) met  + Patient will be independent in basic HEP (2-3 weeks) met  + Patient will perform proper breathing technique and therefore, appropriate abdominal recruitment independently (2-3 weeks) met  + Patient will report being able to perform short and mid distance swing during golf wihout pain(2-4 weeks) met    LTG:  + Patient will have pain level 0/10 lumbar spine  with ADL's (4-6 weeks) met   + Patient will report a 80% improvement in symptoms (4-6 weeks) met   + Patient will increase FOTO score by 10 points (8 sessions) met   + Patient will be independent in comprehensive HEP (4-6 weeks) met   + Patient will demonstrate increase  MMT of  hip to  4/5 for maximum function   (4-6 weeks) met   + Patient will report no functional limitaitons (4-6 weeks) met      Objective     Static Posture     Comments (22)  Posture   Sitting: fair: + forward head, PPT   Standing: reduced lumbar lordosis, left shoulder min higher then right (status quo)     Gait: no assistive device    Palpation: + TTP in area of PSIS ( NT)    Functional movements   Squat: unable to tap standard chair with immediate return to stand, inc hip IR yovani   (-) pain ( improved form with foam in chair)   Bridging: able to lift 90 % of motion, right pelvic drop  (-) pain ( wnl)  Transfers sit/stand: independent    Dermatomes: wnl to light touch    Myotomes   L2 (hip flexion): Right 4+/5  Left: 4+/5   L3 (knee extension): Right: 4+/5 Left: 4+/5   L4 (ankle dorsiflexion): Right: 4+/5 Left: 4+/5   L5 (hallux extension): Right: 4+/5  Left: 4+/5   S1 (ankle plantarflexion): Right: 4+/5 Left: 4+/5  Left clamshell: left: 4-/5 + pain ( 4/5)  Right: 4-/5 no pain  (4/5)    Neg leg length discrepancy in supine    Lumbar ROM    Flexion: wnl LOM    Extension: moderate LOM + central low back pressure, NWAR, prone press up: + "crunchy" in mid lumbar: no pain, min LOM, (status quo)    Side bend: Right: wnl LOM (-) pain     Left: wnl LOM (-) pain        Repeated motions: NT due to osteoporosis and anterior subluxed L4    TA facilitation: level 1 TA ( level 2 : TA + march)    Flexibility   Hamstring: Right: good  Left: good   Hip flexors:Right: good  Left: fair       Hip ROM/MMT: prn        Assessment: Homa Burn demonstrates improvement in objective findings as well as subjectively in symptom and function  Patient demonstrated a good understanding of proper form with HEP and written information was provided  Patient has no functional limitations or symptoms at this time, therefore PT services have been discharged     The goal status of Homa Burn is indicated above   Plan: d/c to HEp         Eval/ Re-eval Auth #/ Referral # Total visits Start date  Expiration date Total active units  Total manual units  PT only or  PT+OT?   4/8/22 Needs auth          4/15/22 PT [facility]: #[id number]  4/8/22 10/5/22 99                                         Aniceto Mohan8 #: [id number] Date 4/8 4/15 4/18 4/21 4/25 4/28      Units  Authed: 99 Used Eval 3 3 3 3 3       Remaining   96 93 90 87 84          Precautions osteoporosis, anterior subluxation of L4      Specialty Daily Treatment Diary       Insurance :     Patient 15 min late   Visits: 3 4 5 6   Manual: 4/18/22 4/21/22 4/25/ 4/28/22   STM              Man Flexibility: hamstring, hip flexolr, piriformis       Ther ex:       Protocol:        Rec bike/  nustep       Clamshells       LTR       Rob test stretch       Seated hamstring stretch  Supine 10 sec x 5   Supine 10 sec x 5   Supine 10 sec x 5     Sit/stand with belt        Lumbar rotation in hooklying 10-15x each   10 x each   sideling thoracic rotation  10x   --   Hamstring curl Peanut  10x  Peanut  10x 10 x yovani    Supine Unilateral Hip Abduction  Red tband  20x per side      Sidestepping with loop  xtra light 16 feet x 2 x 3 rounds xtra light 16 feet x 2 x 3 rounds Yellow 3 rounds, 16 feet           Neuro He:        TA activation  1 sec x 20 5 sec x 10  5 sec x 10   rev   + alternating UE flexion  2 x 10  2 x 10 rev   + marching 2 x 10   2 x 10  rev   + rib trick        + supported dying bug  10 x each  +  iso hold at midline 5 sec x 10   2x10, + 5" iso hold at mid line rev          +hip abd iso  + bridge: 5 sec x 10   + bridge, 5" x15 5 sec x 15    +hip add iso  + bridge: 5 sec x 10   + bridge, 5" x15 5 sec x 15    + alternating LE extension       + SLR       glute set       Prone with pillow under hips alt UE/LE lift    ++ --   Free squats  Standard chair + foam 10 x 2  Standard chair + foam 10 x 2  10 x 2 + foam    Pall off press  Yellow (1) 5 sec x 10   Yellow (1) 5 sec 2 x 10     Lat step w/ antirot (YTB)  1 x 10 B/L Yellow (2) 5 sec x 10     Yellow (2) with lateral steps                         Therapeutic Activity:        Reevaluation              Gait Training:               HEP:               Modalities       MH       ice       Total time:                 Access Code: [email or web address]  URL: [email or web address]  Date: 04/08/2022  Prepared by:  Mika Roberson    Exercises  · Supine Transversus Abdominis Bracing - Hands on Stomach - 1 x daily - 7 x weekly - 1 sets - 10 reps - 5 sec hold  · Supine Transversus Abdominis Brace + Arms - 1 x daily - 7 x weekly - 1 set - 10 reps   · Supine Transversus Abdominis Brace + Posterior Pelvic Tilt + Bridge - 1 set - 7 x weekly - 1 set - 10 reps - 2-3 sec hold      Access Code: [email or web address]  URL: [email or web address]  Date: 04/28/2022  Prepared by:  Jamel Quesada    Exercises  · Supine Hamstring Stretch with Strap - 1 x daily - 7 x weekly - 1 sets - 5 reps - 10 sec hold  · Supine Lower Trunk Rotation - 1 x daily - 7 x weekly - 1 sets - 10 reps  · Supine Transversus Abdominis Bracing with Heel Slide - 1 x daily - 7 x weekly - 1 sets - 10 reps  · Supine Bridge with Resistance Band - 1 x daily - 7 x weekly - 1 sets - 15 reps - 5 sec hold  · Supine Bridge with Mini Swiss Ball Between Knees - 1 x daily - 7 x weekly - 1 sets - 15 reps - 5 sec hold  · Side Stepping with Resistance at Thighs and Counter Support - 1 x daily - 7 x weekly - 4 sets - 8 reps  · Standing Anti-Rotation Press with Anchored Resistance - 1 x daily - 7 x weekly - 1 sets - 10 reps - 5 sec hold  · Sit to Stand Without Arm Support - 1 x daily - 7 x weekly - 2 sets - 10 reps

## 2022-04-29 ENCOUNTER — APPOINTMENT (OUTPATIENT)
Dept: PHYSICAL THERAPY | Facility: CLINIC | Age: 72
End: 2022-04-29
Payer: COMMERCIAL

## 2022-07-14 ENCOUNTER — OFFICE VISIT (OUTPATIENT)
Dept: FAMILY MEDICINE CLINIC | Facility: CLINIC | Age: 72
End: 2022-07-14
Payer: COMMERCIAL

## 2022-07-14 ENCOUNTER — PATIENT MESSAGE (OUTPATIENT)
Dept: FAMILY MEDICINE CLINIC | Facility: CLINIC | Age: 72
End: 2022-07-14

## 2022-07-14 ENCOUNTER — TELEPHONE (OUTPATIENT)
Dept: FAMILY MEDICINE CLINIC | Facility: CLINIC | Age: 72
End: 2022-07-14

## 2022-07-14 VITALS
RESPIRATION RATE: 18 BRPM | WEIGHT: 145 LBS | HEIGHT: 65 IN | TEMPERATURE: 100.3 F | HEART RATE: 81 BPM | OXYGEN SATURATION: 97 % | BODY MASS INDEX: 24.16 KG/M2 | DIASTOLIC BLOOD PRESSURE: 78 MMHG | SYSTOLIC BLOOD PRESSURE: 122 MMHG

## 2022-07-14 DIAGNOSIS — B34.9 VIRAL INFECTION, UNSPECIFIED: Primary | ICD-10-CM

## 2022-07-14 PROCEDURE — U0003 INFECTIOUS AGENT DETECTION BY NUCLEIC ACID (DNA OR RNA); SEVERE ACUTE RESPIRATORY SYNDROME CORONAVIRUS 2 (SARS-COV-2) (CORONAVIRUS DISEASE [COVID-19]), AMPLIFIED PROBE TECHNIQUE, MAKING USE OF HIGH THROUGHPUT TECHNOLOGIES AS DESCRIBED BY CMS-2020-01-R: HCPCS | Performed by: NURSE PRACTITIONER

## 2022-07-14 PROCEDURE — 1160F RVW MEDS BY RX/DR IN RCRD: CPT | Performed by: NURSE PRACTITIONER

## 2022-07-14 PROCEDURE — U0005 INFEC AGEN DETEC AMPLI PROBE: HCPCS | Performed by: NURSE PRACTITIONER

## 2022-07-14 PROCEDURE — 99213 OFFICE O/P EST LOW 20 MIN: CPT | Performed by: NURSE PRACTITIONER

## 2022-07-14 NOTE — PROGRESS NOTES
Assessment/Plan:    1  Viral infection, unspecified  -     COVID Only - Office Collect            Patient Instructions:  Increase fluid intake, saline nasal rinses, and hot tea with honey and lemon  Cool air humidification can be helpful as well  May take Tylenol as needed for pain or fevers  Mucinex D for sinus congestion or Coricidin HBP if you have high blood pressure or a heart condition  Mucinex or Robitussin DM are effective for cough and chest congestion  Vitamin D, vitamin C and Zinc supplementation discussed with patient  Return if symptoms worsen or fail to improve  No future appointments  Subjective:      Patient ID: Yudith Adam is a 66 y o  female  Chief Complaint   Patient presents with    Cough    Fever    Generalized Body Aches    Fatigue    Nausea     Times Monday, Covid neg on Monday and Wednesday               Valerie Singh           Vitals:  /78   Pulse 81   Temp 100 3 °F (37 9 °C)   Resp 18   Ht 5' 5" (1 651 m)   Wt 65 8 kg (145 lb)   SpO2 97%   BMI 24 13 kg/m²     HPI  Patient stated that started with symptoms on 7/11/2022 with cough and progressed to fever, and bodyaches  Fully vaccinated with 2 booster   is currently sick with covid-19  Did home covid-19 test twice which came back negative and last one was yesterday  Stated that will do home rapid test today again and will send PCR from office      The following portions of the patient's history were reviewed and updated as appropriate: allergies, current medications, past family history, past medical history, past social history, past surgical history and problem list       Review of Systems   Constitutional: Positive for fever (100 3)  Negative for chills, diaphoresis, fatigue and unexpected weight change     HENT: Negative for congestion, dental problem, drooling, ear discharge, ear pain, facial swelling, hearing loss, mouth sores, nosebleeds, postnasal drip, rhinorrhea, sinus pressure, sinus pain, sneezing, sore throat, tinnitus, trouble swallowing and voice change  Respiratory: Positive for cough  Negative for chest tightness, shortness of breath and wheezing  Cardiovascular: Negative  Gastrointestinal: Negative for abdominal pain, constipation, diarrhea, nausea and vomiting  Musculoskeletal: Positive for myalgias  Skin: Negative  Neurological: Negative for dizziness, light-headedness and headaches  Objective:    Social History     Tobacco Use   Smoking Status Former Smoker    Types: Cigarettes   Smokeless Tobacco Never Used       Allergies: Allergies   Allergen Reactions    Camphor Hives    Blistex Medicated [Lip Medex]     Penicillins Hives     Reaction Date: [id number];     Shellfish Allergy - Food Allergy      Reaction Date: [id number];     Tetracyclines & Related Hives     Reaction Date: 03Mar2005;     Vicks Babyrub  [Liniments]     Latex Rash     Reaction Date: 31Aug2010;          Current Outpatient Medications   Medication Sig Dispense Refill    calcium carbonate (OS-RICHARD) 600 MG tablet Take by mouth 2 (two) times a day      multivitamin (THERAGRAN) TABS Take by mouth daily      VITAMIN D PO Take by mouth       No current facility-administered medications for this visit  Physical Exam  Vitals reviewed  Constitutional:       Appearance: Normal appearance  She is well-developed  HENT:      Head: Normocephalic  Right Ear: Tympanic membrane, ear canal and external ear normal       Left Ear: Tympanic membrane, ear canal and external ear normal       Nose: Nose normal       Right Sinus: No maxillary sinus tenderness or frontal sinus tenderness  Left Sinus: No maxillary sinus tenderness or frontal sinus tenderness  Mouth/Throat:      Mouth: No oral lesions  Pharynx: No oropharyngeal exudate or posterior oropharyngeal erythema  Cardiovascular:      Rate and Rhythm: Normal rate and regular rhythm        Heart sounds: Normal heart sounds  Pulmonary:      Effort: Pulmonary effort is normal       Breath sounds: Normal breath sounds  Musculoskeletal:         General: Normal range of motion  Cervical back: Neck supple  Lymphadenopathy:      Cervical:      Right cervical: No superficial or posterior cervical adenopathy  Left cervical: No superficial or posterior cervical adenopathy  Skin:     General: Skin is warm and dry  Neurological:      Mental Status: She is alert and oriented to person, place, and time  Psychiatric:         Behavior: Behavior normal          Thought Content:  Thought content normal          Judgment: Judgment normal                      TIM Gtz

## 2022-07-14 NOTE — PATIENT INSTRUCTIONS
Viral Syndrome   AMBULATORY CARE:   Viral syndrome  is a term used for symptoms of an infection caused by a virus  Viruses are spread easily from person to person through the air and on shared items  Signs and symptoms  may start slowly or suddenly and last hours to days  They can be mild to severe and can change over days or hours  You may have any of the following:  Fever and chills    A runny or stuffy nose    Cough, sore throat, or hoarseness    Headache, or pain and pressure around your eyes    Muscle aches and joint pain    Shortness of breath or wheezing    Abdominal pain, cramps, and diarrhea    Nausea, vomiting, or loss of appetite    Call your local emergency number (911 in the [address] [facility]) or have someone else call if:   You have a seizure  You cannot be woken  You have chest pain or trouble breathing  Seek care immediately if:   You have a stiff neck, a bad headache, and sensitivity to light  You feel weak, dizzy, or confused  You stop urinating or urinate a lot less than usual     You cough up blood or thick yellow or green mucus  You have severe abdominal pain or your abdomen is larger than usual     Call your doctor if:   Your symptoms do not get better with treatment or get worse after 3 days  You have a rash or ear pain  You have burning when you urinate  You have questions or concerns about your condition or care  Treatment for viral syndrome  may include medicines to manage your symptoms  Antibiotics are not given for a viral infection  You may  need any of the following:  Acetaminophen  decreases pain and fever  It is available without a doctor's order  Ask how much to take and how often to take it  Follow directions  Read the labels of all other medicines you are using to see if they also contain acetaminophen, or ask your doctor or pharmacist  Acetaminophen can cause liver damage if not taken correctly   Do not use more than 4 grams (4,000 milligrams) total of acetaminophen in one day  NSAIDs , such as ibuprofen, help decrease swelling, pain, and fever  NSAIDs can cause stomach bleeding or kidney problems in certain people  If you take blood thinner medicine, always ask your healthcare provider if NSAIDs are safe for you  Always read the medicine label and follow directions  Cold medicine  helps decrease swelling, control a cough, and relieve chest or nasal congestion  Saline nasal spray  helps decrease nasal congestion  Manage your symptoms:   Drink liquids as directed to prevent dehydration  Ask how much liquid to drink each day and which liquids are best for you  Ask if you should drink an oral rehydration solution (ORS)  An ORS has the right amounts of water, salts, and sugar you need to replace body fluids  This may help prevent dehydration caused by vomiting or diarrhea  Do not drink liquids with caffeine  Liquids with caffeine can make dehydration worse  Get plenty of rest to help your body heal   Take naps throughout the day  Ask your healthcare provider when you can return to work and your normal activities  Use a cool mist humidifier to help you breathe easier  Ask your healthcare provider how to use a cool mist humidifier  Eat honey or use cough drops for a sore throat  Cough drops are available without a doctor's order  Follow directions for taking cough drops  Do not smoke or be close to anyone who is smoking  Nicotine and other chemicals in cigarettes and cigars can cause lung damage  Smoking can also delay healing  Ask your healthcare provider for information if you currently smoke and need help to quit  E-cigarettes or smokeless tobacco still contain nicotine  Talk to your healthcare provider before you use these products  Prevent the spread of germs:       Wash your hands often  Wash your hands several times each day  Wash after you use the bathroom, change a child's diaper, and before you prepare or eat food   Use soap and water every time  Rub your soapy hands together, lacing your fingers  Wash the front and back of your hands, and in between your fingers  Use the fingers of one hand to scrub under the fingernails of the other hand  Wash for at least 20 seconds  Rinse with warm, running water for several seconds  Then dry your hands with a clean towel or paper towel  Use hand  that contains alcohol if soap and water are not available  Do not touch your eyes, nose, or mouth without washing your hands first          Cover a sneeze or cough  Use a tissue that covers your mouth and nose  Throw the tissue away in a trash can right away  Use the bend of your arm if a tissue is not available  Wash your hands well with soap and water or use a hand   Stay away from others while you are sick  Avoid crowds as much as possible  Ask about vaccines you may need  Talk to your healthcare provider about your vaccine history  He or she will tell you which vaccines you need, and when to get them  Get the influenza (flu) vaccine as soon as recommended each year  The flu vaccine is available starting in September or October  Flu viruses change, so it is important to get a flu vaccine every year  Get the pneumonia vaccine if recommended  This vaccine is usually recommended every 5 years  Your provider will tell you when to get this vaccine, if needed  Follow up with your doctor as directed:  Write down your questions so you remember to ask them during your visits  © Copyright [employer] 2022 Information is for End User's use only and may not be sold, redistributed or otherwise used for commercial purposes  All illustrations and images included in CareNotes® are the copyrighted property of [employer]  or Thierry Meek  The above information is an  only  It is not intended as medical advice for individual conditions or treatments   Talk to your doctor, nurse or pharmacist before following any medical regimen to see if it is safe and effective for you

## 2022-07-15 LAB
SARS-COV-2 AG UPPER RESP QL IA: ABNORMAL
SARS-COV-2 RNA RESP QL NAA+PROBE: POSITIVE

## 2022-07-15 NOTE — TELEPHONE ENCOUNTER
From: René Snowden  To:  TIM Levine  Sent: 7/14/2022 9:41 PM EDT  Subject: Positive Covid test result    Jd Wilman

## 2022-07-15 NOTE — TELEPHONE ENCOUNTER
Patient called and results discussed  Stated that feeling same but not worse  Denies any sob and fever  Vitamin D, vitamin C and Zinc supplementation discussed with patient  Discussed about paxlovid in detail with possible adverse effects and as patient does not have any comorbodities and not immunocompromised then decided not to take paxlovid and will continue with supportive care  Advised that has to quarantine for 5 days from symptom onset and as long as fever free for 24 hours without any medication after 5 days then can go to work and be around people just has to wear mask for total 10 days from day of symptom onset      TIM Martin

## 2022-10-12 PROBLEM — H61.23 BILATERAL IMPACTED CERUMEN: Status: RESOLVED | Noted: 2021-09-13 | Resolved: 2022-10-12

## 2022-12-06 ENCOUNTER — VBI (OUTPATIENT)
Dept: ADMINISTRATIVE | Facility: OTHER | Age: 72
End: 2022-12-06

## 2023-01-04 DIAGNOSIS — Z12.31 ENCOUNTER FOR SCREENING MAMMOGRAM FOR MALIGNANT NEOPLASM OF BREAST: Primary | ICD-10-CM

## 2023-01-05 ENCOUNTER — OFFICE VISIT (OUTPATIENT)
Dept: FAMILY MEDICINE CLINIC | Facility: CLINIC | Age: 73
End: 2023-01-05

## 2023-01-05 VITALS
OXYGEN SATURATION: 97 % | DIASTOLIC BLOOD PRESSURE: 78 MMHG | RESPIRATION RATE: 16 BRPM | SYSTOLIC BLOOD PRESSURE: 140 MMHG | BODY MASS INDEX: 24.96 KG/M2 | HEART RATE: 85 BPM | WEIGHT: 150 LBS | TEMPERATURE: 98.3 F

## 2023-01-05 DIAGNOSIS — N39.0 ACUTE UTI: Primary | ICD-10-CM

## 2023-01-05 LAB
SL AMB  POCT GLUCOSE, UA: ABNORMAL
SL AMB LEUKOCYTE ESTERASE,UA: ABNORMAL
SL AMB POCT BILIRUBIN,UA: ABNORMAL
SL AMB POCT BLOOD,UA: ABNORMAL
SL AMB POCT CLARITY,UA: ABNORMAL
SL AMB POCT COLOR,UA: ABNORMAL
SL AMB POCT KETONES,UA: ABNORMAL
SL AMB POCT NITRITE,UA: ABNORMAL
SL AMB POCT PH,UA: 6
SL AMB POCT SPECIFIC GRAVITY,UA: 1.03
SL AMB POCT URINE PROTEIN: 100
SL AMB POCT UROBILINOGEN: 0.2

## 2023-01-05 RX ORDER — MULTIVIT-MIN/IRON FUM/FOLIC AC 7.5 MG-4
1 TABLET ORAL DAILY
COMMUNITY

## 2023-01-05 RX ORDER — NITROFURANTOIN 25; 75 MG/1; MG/1
100 CAPSULE ORAL 2 TIMES DAILY
Qty: 10 CAPSULE | Refills: 0 | Status: SHIPPED | OUTPATIENT
Start: 2023-01-05 | End: 2023-01-05 | Stop reason: SDUPTHER

## 2023-01-05 RX ORDER — NITROFURANTOIN 25; 75 MG/1; MG/1
100 CAPSULE ORAL 2 TIMES DAILY
Qty: 10 CAPSULE | Refills: 0 | Status: SHIPPED | OUTPATIENT
Start: 2023-01-05 | End: 2023-01-10

## 2023-01-05 NOTE — PROGRESS NOTES
Assessment/Plan:    Will start on Macrobid and send urine for culture  Encouraged to hydrate well  Will f/u with results of culture    1  Acute UTI  -     POCT urine dip auto non-scope  -     Urine culture  -     nitrofurantoin (MACROBID) 100 mg capsule; Take 1 capsule (100 mg total) by mouth 2 (two) times a day for 5 days    Recent Results (from the past 24 hour(s))   POCT urine dip auto non-scope    Collection Time: 01/05/23 11:21 AM   Result Value Ref Range     COLOR,UA light brown     CLARITY,UA cloudy     SPECIFIC GRAVITY,UA 1 030      PH,UA 6 0     LEUKOCYTE ESTERASE,UA small     NITRITE,UA neg     GLUCOSE, UA neg     KETONES,UA neg     BILIRUBIN,UA neg     BLOOD,UA mod     POCT URINE PROTEIN 100     SL AMB POCT UROBILINOGEN 0 2              There are no Patient Instructions on file for this visit  Return if symptoms worsen or fail to improve  Subjective:      Patient ID: Rachael Bateman is a 67 y o  female  Chief Complaint   Patient presents with   • Urinary Tract Infection     Sas/cma       Here today with complaints of cramping with urination and hesitancy  No dysuria, hematuria  No fevers, chills, flank pain  Has been trying to increase fluids with no relief  The following portions of the patient's history were reviewed and updated as appropriate: allergies, current medications, past family history, past medical history, past social history, past surgical history and problem list     Review of Systems   Constitutional: Negative  Genitourinary:        See HPI   All other systems reviewed and are negative          Current Outpatient Medications   Medication Sig Dispense Refill   • calcium carbonate (OS-RICHARD) 600 MG tablet Take by mouth 2 (two) times a day     • Multiple Vitamins-Minerals (multivitamin with minerals) tablet Take 1 tablet by mouth daily     • multivitamin (THERAGRAN) TABS Take by mouth daily     • nitrofurantoin (MACROBID) 100 mg capsule Take 1 capsule (100 mg total) by mouth 2 (two) times a day for 5 days 10 capsule 0   • VITAMIN D PO Take by mouth       No current facility-administered medications for this visit  Objective:    /78   Pulse 85   Temp 98 3 °F (36 8 °C)   Resp 16   Wt 68 kg (150 lb)   SpO2 97%   BMI 24 96 kg/m²        Physical Exam  Vitals and nursing note reviewed  Constitutional:       Appearance: Normal appearance  She is well-developed  Cardiovascular:      Rate and Rhythm: Normal rate and regular rhythm  Heart sounds: Normal heart sounds  No murmur heard  Pulmonary:      Effort: Pulmonary effort is normal       Breath sounds: Normal breath sounds  Skin:     General: Skin is warm and dry  Neurological:      Mental Status: She is alert     Psychiatric:         Mood and Affect: Mood normal          Behavior: Behavior normal                 JangVALENTIN CoughlinNP

## 2023-01-07 ENCOUNTER — HOSPITAL ENCOUNTER (OUTPATIENT)
Dept: RADIOLOGY | Age: 73
Discharge: HOME | End: 2023-01-07

## 2023-01-07 VITALS — HEIGHT: 65 IN | WEIGHT: 150 LBS | BODY MASS INDEX: 24.99 KG/M2

## 2023-01-07 DIAGNOSIS — Z12.31 ENCOUNTER FOR SCREENING MAMMOGRAM FOR MALIGNANT NEOPLASM OF BREAST: ICD-10-CM

## 2023-01-07 LAB — BACTERIA UR CULT: ABNORMAL

## 2023-01-10 ENCOUNTER — OFFICE VISIT (OUTPATIENT)
Dept: FAMILY MEDICINE CLINIC | Facility: CLINIC | Age: 73
End: 2023-01-10

## 2023-01-10 VITALS
RESPIRATION RATE: 14 BRPM | WEIGHT: 152 LBS | HEART RATE: 72 BPM | DIASTOLIC BLOOD PRESSURE: 64 MMHG | HEIGHT: 65 IN | OXYGEN SATURATION: 98 % | SYSTOLIC BLOOD PRESSURE: 118 MMHG | TEMPERATURE: 97.3 F | BODY MASS INDEX: 25.33 KG/M2

## 2023-01-10 DIAGNOSIS — J02.9 ACUTE PHARYNGITIS, UNSPECIFIED ETIOLOGY: Primary | ICD-10-CM

## 2023-01-10 LAB — S PYO AG THROAT QL: NEGATIVE

## 2023-01-10 RX ORDER — GLUCOSAMINE/MSM/CHONDROIT SULF 500-166.6
TABLET ORAL
COMMUNITY

## 2023-01-10 NOTE — PROGRESS NOTES
Assessment/Plan:    Rapid strep negative  Symptoms overall improving  Continue symptomatic treatment, f/u for any persistent/worsening symptoms    1  Acute pharyngitis, unspecified etiology  -     POCT rapid strepA    Recent Results (from the past 24 hour(s))   POCT rapid strepA    Collection Time: 01/10/23  3:00 PM   Result Value Ref Range     RAPID STREP A Negative Negative       BMI Counseling: Body mass index is 25 29 kg/m²  The BMI is above normal  Nutrition recommendations include consuming healthier snacks  Exercise recommendations include moderate physical activity 150 minutes/week  Rationale for BMI follow-up plan is due to patient being overweight or obese  Depression Screening and Follow-up Plan: Patient was screened for depression during today's encounter  They screened negative with a PHQ-2 score of 0  There are no Patient Instructions on file for this visit  No follow-ups on file  Subjective:      Patient ID: No Elmore is a 67 y o  female  Chief Complaint   Patient presents with   • Sore Throat     Onset: 3 weeks  klcma       Here today with sore throat  Had a lot of congestion, cough, PND  Symptoms were resolving, then developed sore throat, swollen glands  Has pain with swallowing, but does feel a little better today  The following portions of the patient's history were reviewed and updated as appropriate: allergies, current medications, past family history, past medical history, past social history, past surgical history and problem list     Review of Systems   Constitutional: Negative for chills, fatigue and fever  HENT: Positive for congestion and sore throat  Negative for ear pain, postnasal drip, rhinorrhea and sinus pressure  Respiratory: Negative for cough, shortness of breath and wheezing  Cardiovascular: Negative for chest pain  Gastrointestinal: Negative for abdominal pain, diarrhea, nausea and vomiting     Musculoskeletal: Negative for arthralgias  Skin: Negative for rash  Neurological: Negative for headaches  Current Outpatient Medications   Medication Sig Dispense Refill   • Ascorbic Acid (Vitamin C) 125 MG CHEW Chew     • calcium carbonate (OS-RICHARD) 600 MG tablet Take by mouth 2 (two) times a day     • Multiple Vitamins-Minerals (multivitamin with minerals) tablet Take 1 tablet by mouth daily     • multivitamin (THERAGRAN) TABS Take by mouth daily     • VITAMIN D PO Take by mouth       No current facility-administered medications for this visit  Objective:    /64   Pulse 72   Temp (!) 97 3 °F (36 3 °C)   Resp 14   Ht 5' 5" (1 651 m)   Wt 68 9 kg (152 lb)   SpO2 98%   BMI 25 29 kg/m²        Physical Exam  Vitals and nursing note reviewed  Constitutional:       Appearance: She is well-developed  HENT:      Right Ear: Tympanic membrane normal       Left Ear: Tympanic membrane normal       Nose: Congestion present  Mouth/Throat:      Pharynx: Oropharynx is clear  No oropharyngeal exudate or posterior oropharyngeal erythema  Cardiovascular:      Rate and Rhythm: Normal rate and regular rhythm  Heart sounds: Normal heart sounds  No murmur heard  Pulmonary:      Effort: Pulmonary effort is normal       Breath sounds: Normal breath sounds  Skin:     General: Skin is warm and dry  Neurological:      Mental Status: She is alert     Psychiatric:         Mood and Affect: Mood normal          Behavior: Behavior normal                 TIM Mcconnell

## 2023-01-11 ENCOUNTER — ANNUAL EXAM (OUTPATIENT)
Dept: OBGYN CLINIC | Facility: CLINIC | Age: 73
End: 2023-01-11

## 2023-01-11 VITALS
HEIGHT: 65 IN | BODY MASS INDEX: 25.43 KG/M2 | DIASTOLIC BLOOD PRESSURE: 70 MMHG | WEIGHT: 152.6 LBS | SYSTOLIC BLOOD PRESSURE: 130 MMHG

## 2023-01-11 DIAGNOSIS — Z12.31 ENCOUNTER FOR SCREENING MAMMOGRAM FOR MALIGNANT NEOPLASM OF BREAST: Primary | ICD-10-CM

## 2023-01-11 DIAGNOSIS — N95.2 ATROPHIC VAGINITIS: ICD-10-CM

## 2023-01-11 DIAGNOSIS — Z01.419 ENCOUNTER FOR GYNECOLOGICAL EXAMINATION WITHOUT ABNORMAL FINDING: ICD-10-CM

## 2023-01-11 NOTE — PROGRESS NOTES
Assessment/Plan:    No problem-specific Assessment & Plan notes found for this encounter  Diagnoses and all orders for this visit:    Encounter for screening mammogram for malignant neoplasm of breast  -     Mammo screening bilateral w 3d & cad; Future    Encounter for gynecological examination without abnormal finding    Atrophic vaginitis           Normal gynecological physical examination  Self-breast examination stressed  Mammogram ordered  Discussed regular exercise, healthy diet, importance of vitamin D and calcium supplements  Discussed importance of sun block use during periods of prolonged sun exposure  Patient will be seen in 1 year for routine gynecologic and medical examination  Patient will call office for any problems, concerns, or issues which may arise during the interim  Subjective:          HPI    Patient ID: Gretchen Rojas is a 67 y o  female who presents today for her annual gynecologic and medical examination    Menstrual status: Patient is postmenopausal and denies any vaginal bleeding at this time    Vasomotor symptoms: Denies any significant vasomotor symptoms    Patient reports normal appetite    Patient reports normal bowel and bladder habits    Patient denies any significant pelvic or abdominal pain    Patient denies any headaches, chest pain, shortness of breath fever shakes or chills    Patient denies any COVID 19 symptoms including cough or loss of taste or smell    COVID vaccine status: Patient up-to-date with COVID vaccination    Medical problems: Patient not being followed for any significant medical problems currently    Colonoscopy status: Patient is scheduled for Cologuard    Mammogram status: Importance of self breast examination was stressed to the patient and she is up-to-date with screening mammography  Appropriate arrangements for her annual screening mammogram were placed into the EMR system at today's visit      The following portions of the patient's history were reviewed and updated as appropriate: allergies, current medications, past family history, past medical history, past social history, past surgical history and problem list     Review of Systems   Constitutional: Negative  Negative for appetite change, diaphoresis, fatigue, fever and unexpected weight change  HENT: Negative  Eyes: Negative  Respiratory: Negative  Cardiovascular: Negative  Gastrointestinal: Negative  Negative for abdominal pain, blood in stool, constipation, diarrhea, nausea and vomiting  Endocrine: Negative  Negative for cold intolerance and heat intolerance  Genitourinary: Negative  Negative for dysuria, frequency, hematuria, urgency, vaginal bleeding, vaginal discharge and vaginal pain  Musculoskeletal: Negative  Skin: Negative  Allergic/Immunologic: Negative  Neurological: Negative  Hematological: Negative  Negative for adenopathy  Psychiatric/Behavioral: Negative  Objective:      /70   Ht 5' 5" (1 651 m)   Wt 69 2 kg (152 lb 9 6 oz)   BMI 25 39 kg/m²          Physical Exam  Constitutional:       General: She is not in acute distress  Appearance: Normal appearance  She is well-developed  She is not diaphoretic  HENT:      Head: Normocephalic and atraumatic  Eyes:      Pupils: Pupils are equal, round, and reactive to light  Cardiovascular:      Rate and Rhythm: Normal rate and regular rhythm  Heart sounds: Normal heart sounds  No murmur heard  No friction rub  No gallop  Pulmonary:      Effort: Pulmonary effort is normal       Breath sounds: Normal breath sounds  Chest:   Breasts:     Breasts are symmetrical       Right: No inverted nipple, mass, nipple discharge, skin change or tenderness  Left: No inverted nipple, mass, nipple discharge, skin change or tenderness  Abdominal:      General: Bowel sounds are normal       Palpations: Abdomen is soft  Genitourinary:     General: Normal vulva        Exam position: Supine  Labia:         Right: No rash or lesion  Left: No rash or lesion  Urethra: No urethral swelling or urethral lesion  Vagina: Normal  No vaginal discharge, erythema, tenderness or bleeding  Cervix: No discharge or friability  Uterus: Not enlarged and not tender  Adnexa:         Right: No mass, tenderness or fullness  Left: No mass, tenderness or fullness  Rectum: Normal  Guaiac result negative  Comments: Pelvic exam revealed minimal atrophic vaginitis  Good pelvic support confirmed  Musculoskeletal:         General: Normal range of motion  Cervical back: Normal range of motion and neck supple  Lymphadenopathy:      Cervical: No cervical adenopathy  Upper Body:      Right upper body: No supraclavicular adenopathy  Left upper body: No supraclavicular adenopathy  Skin:     General: Skin is warm and dry  Findings: No rash  Neurological:      General: No focal deficit present  Mental Status: She is alert and oriented to person, place, and time  Psychiatric:         Mood and Affect: Mood normal          Speech: Speech normal          Behavior: Behavior normal          Thought Content:  Thought content normal          Judgment: Judgment normal

## 2023-01-20 ENCOUNTER — VBI (OUTPATIENT)
Dept: ADMINISTRATIVE | Facility: OTHER | Age: 73
End: 2023-01-20

## 2023-01-20 NOTE — TELEPHONE ENCOUNTER
01/20/23 3:22 PM    The patient was called and reminded about the open Cologuard order  Instructed to call the ordering provider's office if there are any questions about completing the CRC screen Pt states will take care of it       Thank you    Nighat Mcdonald MA  [facility]

## 2023-05-26 ENCOUNTER — OFFICE VISIT (OUTPATIENT)
Dept: FAMILY MEDICINE CLINIC | Facility: CLINIC | Age: 73
End: 2023-05-26

## 2023-05-26 VITALS
BODY MASS INDEX: 24.83 KG/M2 | TEMPERATURE: 97.5 F | HEIGHT: 65 IN | OXYGEN SATURATION: 97 % | HEART RATE: 82 BPM | WEIGHT: 149 LBS | DIASTOLIC BLOOD PRESSURE: 80 MMHG | RESPIRATION RATE: 16 BRPM | SYSTOLIC BLOOD PRESSURE: 120 MMHG

## 2023-05-26 DIAGNOSIS — Z13.6 SCREENING FOR CARDIOVASCULAR CONDITION: ICD-10-CM

## 2023-05-26 DIAGNOSIS — L65.9 HAIR LOSS: ICD-10-CM

## 2023-05-26 DIAGNOSIS — R19.7 DIARRHEA, UNSPECIFIED TYPE: Primary | ICD-10-CM

## 2023-05-26 DIAGNOSIS — Z13.0 SCREENING FOR DEFICIENCY ANEMIA: ICD-10-CM

## 2023-05-26 DIAGNOSIS — E78.2 MIXED HYPERLIPIDEMIA: ICD-10-CM

## 2023-05-26 NOTE — PROGRESS NOTES
Name: Kathie Frye      : 1950      MRN: [id number]  Encounter Provider: Diane Reyes DO  Encounter Date: 2023   Encounter department: [address] [place] [address]     1  Diarrhea, unspecified type  Comments:  suspect may be stressed related, will have her see GI for further evaluation  also needs a colonoscopy  Orders:  -     Ambulatory referral to Gastroenterology; Future    2  Mixed hyperlipidemia  Assessment & Plan:  Been high in the past  We will check labs    Orders:  -     Lipid Panel with Direct LDL reflex; Future    3  Screening for cardiovascular condition  -     Comprehensive metabolic panel; Future    4  Screening for deficiency anemia  -     CBC; Future    5  Hair loss  Comments:  She reports having thinning hair on the top of her head  Medication options discussed such as oral minoxidil versus topical   She is going to use the over-the-counter topical treatment  Risk benefits of treatment discussed  Orders:  -     TSH, 3rd generation; Future  -     Ferritin; Future      Return in about 1 month (around 2023) for Annual Wellness Visit  Subjective      She has been busy taking care of her   She has had 3 episodes of bad of diarrhea over the past 6 weeks  She normally leans towards constipation so the diarrhea is new  She is also having issues with thinning hair on the top of her head and is concerned  It has been going on for quite some time      Review of Systems    Current Outpatient Medications on File Prior to Visit   Medication Sig   • Ascorbic Acid (Vitamin C) 125 MG CHEW Chew   • calcium carbonate (OS-RICHARD) 600 MG tablet Take by mouth 2 (two) times a day   • Multiple Vitamins-Minerals (multivitamin with minerals) tablet Take 1 tablet by mouth daily   • VITAMIN D PO Take by mouth   • multivitamin (THERAGRAN) TABS Take by mouth daily (Patient not taking: Reported on 4/15/2023)       Objective     /80 (BP Location: Left arm, Patient "Position: Sitting, Cuff Size: Standard)   Pulse 82   Temp 97 5 °F (36 4 °C) (Temporal)   Resp 16   Ht 5' 5\" (1 651 m)   Wt 67 6 kg (149 lb)   SpO2 97%   BMI 24 79 kg/m²     Physical Exam  Vitals and nursing note reviewed  Constitutional:       Appearance: She is well-developed  HENT:      Head: Normocephalic and atraumatic  Right Ear: Tympanic membrane and external ear normal       Left Ear: Tympanic membrane and external ear normal    Cardiovascular:      Rate and Rhythm: Normal rate and regular rhythm  Heart sounds: Normal heart sounds  No murmur heard  No friction rub  Pulmonary:      Effort: Pulmonary effort is normal  No respiratory distress  Breath sounds: Normal breath sounds  No wheezing or rales  Musculoskeletal:      Right lower leg: No edema  Left lower leg: No edema         Steven Garcia DO"

## 2023-06-16 ENCOUNTER — OFFICE VISIT (OUTPATIENT)
Dept: FAMILY MEDICINE CLINIC | Facility: CLINIC | Age: 73
End: 2023-06-16
Payer: COMMERCIAL

## 2023-06-16 VITALS
TEMPERATURE: 97.4 F | HEIGHT: 65 IN | DIASTOLIC BLOOD PRESSURE: 74 MMHG | BODY MASS INDEX: 24.49 KG/M2 | WEIGHT: 147 LBS | RESPIRATION RATE: 18 BRPM | HEART RATE: 70 BPM | OXYGEN SATURATION: 98 % | SYSTOLIC BLOOD PRESSURE: 126 MMHG

## 2023-06-16 DIAGNOSIS — R10.9 ABDOMINAL DISCOMFORT: Primary | ICD-10-CM

## 2023-06-16 PROCEDURE — 99213 OFFICE O/P EST LOW 20 MIN: CPT | Performed by: FAMILY MEDICINE

## 2023-06-16 NOTE — PROGRESS NOTES
Name: Jorgito Fuller      : 1950      MRN: [id number]  Encounter Provider: Bharati Porter MD  Encounter Date: 2023   Encounter department: [address] [place] [address]     1  Abdominal discomfort    She has had 4 episodes of diarrhea over the past month and now has abdominal discomfort and bloating  She was worried about diverticulitis, but she currently has no abdominal pain/tenderness, blood in the stool, diarrhea, fevers/chills so will hold off on imaging or antibiotics  Discussed possible food intolerance  She does have an appointment scheduled with GI  Likely will need a colonoscopy  For now recommend a bland diet with small frequent meals  Subjective      HPI   Over the past month she has had 4 episodes of diarrhea for which she was seen in the office  Most recent episode was 4 days ago after eating at a [facility]  After that she had stomach cramps, bloating  Denies current diarrhea, blood in the stool, fevers, chills, nausea, vomiting  She has been eating a bland diet since then  Review of Systems   Constitutional: Negative  HENT: Negative  Eyes: Negative  Respiratory: Negative  Cardiovascular: Negative  Gastrointestinal: Negative  Bloating, abdominal discomfort  Endocrine: Negative  Genitourinary: Negative  Musculoskeletal: Negative  Skin: Negative  Allergic/Immunologic: Negative  Neurological: Negative  Hematological: Negative  Psychiatric/Behavioral: Negative          Current Outpatient Medications on File Prior to Visit   Medication Sig   • Ascorbic Acid (Vitamin C) 125 MG CHEW Chew   • calcium carbonate (OS-RICHARD) 600 MG tablet Take by mouth 2 (two) times a day   • Multiple Vitamins-Minerals (multivitamin with minerals) tablet Take 1 tablet by mouth daily   • VITAMIN D PO Take by mouth   • multivitamin (THERAGRAN) TABS Take by mouth daily (Patient not taking: Reported on 4/15/2023)       Objective     BP "126/74   Pulse 70   Temp (!) 97 4 °F (36 3 °C)   Resp 18   Ht 5' 5\" (1 651 m)   Wt 66 7 kg (147 lb)   SpO2 98%   BMI 24 46 kg/m²     Physical Exam  Constitutional:       General: She is not in acute distress  Appearance: She is well-developed  She is not diaphoretic  HENT:      Head: Normocephalic and atraumatic  Eyes:      General: No scleral icterus  Right eye: No discharge  Left eye: No discharge  Conjunctiva/sclera: Conjunctivae normal    Pulmonary:      Effort: Pulmonary effort is normal    Abdominal:      General: Abdomen is flat  Bowel sounds are normal  There is no distension  Palpations: Abdomen is soft  There is no mass  Tenderness: There is no abdominal tenderness  There is no guarding or rebound  Hernia: No hernia is present  Musculoskeletal:      Cervical back: Normal range of motion  Skin:     General: Skin is warm  Neurological:      Mental Status: She is alert and oriented to person, place, and time  Psychiatric:         Behavior: Behavior normal          Thought Content:  Thought content normal          Judgment: Judgment normal        Savannah Aquino MD  "

## 2023-09-28 ENCOUNTER — RA CDI HCC (OUTPATIENT)
Dept: OTHER | Facility: HOSPITAL | Age: 73
End: 2023-09-28

## 2023-09-28 NOTE — PROGRESS NOTES
[address] [facility] coding opportunities       Chart reviewed, no opportunity found: [address]        Patients Insurance     Medicare Insurance: [employer]

## 2023-10-11 ENCOUNTER — TELEPHONE (OUTPATIENT)
Age: 73
End: 2023-10-11

## 2023-10-30 ENCOUNTER — APPOINTMENT (OUTPATIENT)
Dept: LAB | Facility: CLINIC | Age: 73
End: 2023-10-30
Payer: COMMERCIAL

## 2023-10-30 DIAGNOSIS — E78.2 MIXED HYPERLIPIDEMIA: ICD-10-CM

## 2023-10-30 DIAGNOSIS — Z13.6 SCREENING FOR CARDIOVASCULAR CONDITION: ICD-10-CM

## 2023-10-30 DIAGNOSIS — L65.9 HAIR LOSS: ICD-10-CM

## 2023-10-30 DIAGNOSIS — Z13.0 SCREENING FOR DEFICIENCY ANEMIA: ICD-10-CM

## 2023-10-30 LAB
ALBUMIN SERPL BCP-MCNC: 4 G/DL (ref 3.5–5)
ALP SERPL-CCNC: 76 U/L (ref 34–104)
ALT SERPL W P-5'-P-CCNC: 16 U/L (ref 7–52)
ANION GAP SERPL CALCULATED.3IONS-SCNC: 5 MMOL/L
AST SERPL W P-5'-P-CCNC: 24 U/L (ref 13–39)
BILIRUB SERPL-MCNC: 0.36 MG/DL (ref 0.2–1)
BUN SERPL-MCNC: 16 MG/DL (ref 5–25)
CALCIUM SERPL-MCNC: 9.6 MG/DL (ref 8.4–10.2)
CHLORIDE SERPL-SCNC: 106 MMOL/L (ref 96–108)
CHOLEST SERPL-MCNC: 230 MG/DL
CO2 SERPL-SCNC: 31 MMOL/L (ref 21–32)
CREAT SERPL-MCNC: 0.69 MG/DL (ref 0.6–1.3)
ERYTHROCYTE [DISTWIDTH] IN BLOOD BY AUTOMATED COUNT: 13.5 % (ref 11.6–15.1)
FERRITIN SERPL-MCNC: 89 NG/ML (ref 11–307)
GFR SERPL CREATININE-BSD FRML MDRD: 86 ML/MIN/1.73SQ M
GLUCOSE P FAST SERPL-MCNC: 92 MG/DL (ref 65–99)
HCT VFR BLD AUTO: 40.2 % (ref 34.8–46.1)
HDLC SERPL-MCNC: 85 MG/DL
HGB BLD-MCNC: 12.4 G/DL (ref 11.5–15.4)
LDLC SERPL CALC-MCNC: 132 MG/DL (ref 0–100)
MCH RBC QN AUTO: 30.9 PG (ref 26.8–34.3)
MCHC RBC AUTO-ENTMCNC: 30.8 G/DL (ref 31.4–37.4)
MCV RBC AUTO: 100 FL (ref 82–98)
PLATELET # BLD AUTO: 206 THOUSANDS/UL (ref 149–390)
PMV BLD AUTO: 11.5 FL (ref 8.9–12.7)
POTASSIUM SERPL-SCNC: 4.1 MMOL/L (ref 3.5–5.3)
PROT SERPL-MCNC: 6.7 G/DL (ref 6.4–8.4)
RBC # BLD AUTO: 4.01 MILLION/UL (ref 3.81–5.12)
SODIUM SERPL-SCNC: 142 MMOL/L (ref 135–147)
TRIGL SERPL-MCNC: 64 MG/DL
TSH SERPL DL<=0.05 MIU/L-ACNC: 2.72 UIU/ML (ref 0.45–4.5)
WBC # BLD AUTO: 4.2 THOUSAND/UL (ref 4.31–10.16)

## 2023-10-30 PROCEDURE — 82728 ASSAY OF FERRITIN: CPT

## 2023-10-30 PROCEDURE — 36415 COLL VENOUS BLD VENIPUNCTURE: CPT

## 2023-10-30 PROCEDURE — 84443 ASSAY THYROID STIM HORMONE: CPT

## 2023-10-30 PROCEDURE — 80053 COMPREHEN METABOLIC PANEL: CPT

## 2023-10-30 PROCEDURE — 80061 LIPID PANEL: CPT

## 2023-10-30 PROCEDURE — 85027 COMPLETE CBC AUTOMATED: CPT

## 2023-10-31 ENCOUNTER — OFFICE VISIT (OUTPATIENT)
Dept: FAMILY MEDICINE CLINIC | Facility: CLINIC | Age: 73
End: 2023-10-31
Payer: COMMERCIAL

## 2023-10-31 VITALS
SYSTOLIC BLOOD PRESSURE: 126 MMHG | HEART RATE: 76 BPM | RESPIRATION RATE: 16 BRPM | DIASTOLIC BLOOD PRESSURE: 70 MMHG | HEIGHT: 65 IN | WEIGHT: 141 LBS | TEMPERATURE: 98.6 F | BODY MASS INDEX: 23.49 KG/M2

## 2023-10-31 DIAGNOSIS — Z00.00 MEDICARE ANNUAL WELLNESS VISIT, SUBSEQUENT: Primary | ICD-10-CM

## 2023-10-31 DIAGNOSIS — R92.30 DENSE BREAST: ICD-10-CM

## 2023-10-31 DIAGNOSIS — E78.2 MIXED HYPERLIPIDEMIA: ICD-10-CM

## 2023-10-31 DIAGNOSIS — R92.2 DENSE BREAST: ICD-10-CM

## 2023-10-31 DIAGNOSIS — Z12.31 SCREENING MAMMOGRAM, ENCOUNTER FOR: ICD-10-CM

## 2023-10-31 PROCEDURE — 3288F FALL RISK ASSESSMENT DOCD: CPT | Performed by: FAMILY MEDICINE

## 2023-10-31 PROCEDURE — G0439 PPPS, SUBSEQ VISIT: HCPCS | Performed by: FAMILY MEDICINE

## 2023-10-31 PROCEDURE — 3725F SCREEN DEPRESSION PERFORMED: CPT | Performed by: FAMILY MEDICINE

## 2023-10-31 PROCEDURE — 1160F RVW MEDS BY RX/DR IN RCRD: CPT | Performed by: FAMILY MEDICINE

## 2023-10-31 PROCEDURE — 1170F FXNL STATUS ASSESSED: CPT | Performed by: FAMILY MEDICINE

## 2023-10-31 PROCEDURE — 1090F PRES/ABSN URINE INCON ASSESS: CPT | Performed by: FAMILY MEDICINE

## 2023-10-31 PROCEDURE — 1159F MED LIST DOCD IN RCRD: CPT | Performed by: FAMILY MEDICINE

## 2023-10-31 PROCEDURE — 1125F AMNT PAIN NOTED PAIN PRSNT: CPT | Performed by: FAMILY MEDICINE

## 2023-10-31 NOTE — ASSESSMENT & PLAN NOTE
Improved with diet  Still at risk for heart disease  She doesn't want to go on medication due to risk of side effects. We discussed the option of taking it every other day.

## 2023-10-31 NOTE — PROGRESS NOTES
Assessment and Plan:     Problem List Items Addressed This Visit     Mixed hyperlipidemia     Improved with diet  Still at risk for heart disease  She doesn't want to go on medication due to risk of side effects. We discussed the option of taking it every other day. Other Visit Diagnoses     Medicare annual wellness visit, subsequent    -  Primary    Screening mammogram, encounter for        Relevant Orders    US breast screening bilateral complete (ABUS)    Mammo screening bilateral w 3d & cad    Dense breast        Relevant Orders    US breast screening bilateral complete (ABUS)            Depression Screening and Follow-up Plan: Patient was screened for depression during today's encounter. They screened negative with a PHQ-2 score of 0. Declined flu and pneumonia vaccine   Preventive health issues were discussed with patient, and age appropriate screening tests were ordered as noted in patient's After Visit Summary. Personalized health advice and appropriate referrals for health education or preventive services given if needed, as noted in patient's After Visit Summary. History of Present Illness:     Patient presents for a Medicare Wellness Visit    She has been watching her diet. She has not had any diarrhea for the past few months. Patient Care Team:  Orion Oquendo DO as PCP - General (Family Medicine)  Sherin Pinta, MD Dario Gowers, MD Berlinda Shiner, MD     Review of Systems:     Review of Systems   Constitutional: Negative. Respiratory: Negative. Cardiovascular: Negative. Problem List:     Patient Active Problem List   Diagnosis   • Actinic keratosis   • Arthritis   • Chondromalacia of left patella   • Chronic rhinitis   • Diverticulitis of colon   • Osteoporosis   • Mixed hyperlipidemia      Past Medical and Surgical History:     History reviewed. No pertinent past medical history.   Past Surgical History:   Procedure Laterality Date   • KNEE SURGERY     • TONSILECTOMY AND ADNOIDECTOMY        Family History:     Family History   Problem Relation Age of Onset   • Bone cancer Mother 62   • Stomach cancer Mother    • Coronary artery disease Father    • No Known Problems Sister    • Stomach cancer Maternal Grandmother    • No Known Problems Maternal Grandfather    • No Known Problems Paternal Grandmother    • No Known Problems Paternal Grandfather    • Cancer Maternal Aunt    • No Known Problems Maternal Aunt    • No Known Problems Maternal Aunt    • No Known Problems Maternal Aunt    • No Known Problems Paternal Aunt    • No Known Problems Paternal Aunt    • No Known Problems Paternal Aunt       Social History:     Social History     Socioeconomic History   • Marital status: /Civil Union     Spouse name: None   • Number of children: None   • Years of education: None   • Highest education level: None   Occupational History   • None   Tobacco Use   • Smoking status: Former     Packs/day: 0.50     Years: 25.00     Total pack years: 12.50     Types: Cigarettes     Start date:      Quit date:      Years since quittin.8     Passive exposure: Past   • Smokeless tobacco: Never   • Tobacco comments:     Started at age 24   sas/cma   Vaping Use   • Vaping Use: Never used   Substance and Sexual Activity   • Alcohol use: Yes     Alcohol/week: 3.0 standard drinks of alcohol     Types: 3 Glasses of wine per week     Comment: social   • Drug use: No   • Sexual activity: Yes   Other Topics Concern   • None   Social History Narrative   • None     Social Determinants of Health     Financial Resource Strain: Low Risk  (10/31/2023)    Overall Financial Resource Strain (CARDIA)    • Difficulty of Paying Living Expenses: Not hard at all   Food Insecurity: Not on file   Transportation Needs: No Transportation Needs (10/31/2023)    PRAPARE - Transportation    • Lack of Transportation (Medical): No    • Lack of Transportation (Non-Medical):  No   Physical Activity: Not on file Stress: Not on file   Social Connections: Not on file   Intimate Partner Violence: Not on file   Housing Stability: Not on file      Medications and Allergies:     Current Outpatient Medications   Medication Sig Dispense Refill   • Multiple Vitamins-Minerals (multivitamin with minerals) tablet Take 1 tablet by mouth daily     • VITAMIN D PO Take by mouth     • Ascorbic Acid (Vitamin C) 125 MG CHEW Chew     • calcium carbonate (OS-RICHARD) 600 MG tablet Take by mouth 2 (two) times a day     • multivitamin (THERAGRAN) TABS Take by mouth daily (Patient not taking: Reported on 4/15/2023)       No current facility-administered medications for this visit. Allergies   Allergen Reactions   • Camphor Hives   • Blistex Medicated [Lip Medex]    • Penicillins Hives     Reaction Date: [id number];    • Shellfish Allergy - Food Allergy      Reaction Date: [id number];    • Sulfa Antibiotics Hives     As a child    • Tetracyclines & Related Hives     Reaction Date: [id number];    • Vicks Babyrub  [Liniments]    • Latex Rash     Reaction Date: [id number];       Immunizations:     Immunization History   Administered Date(s) Administered   • COVID-19 PFIZER VACCINE 0.3 ML IM 02/21/2021, 03/14/2021, 09/25/2021   • COVID-19 Pfizer vac (Bo-sucrose, gray cap) 12 yr+ IM 04/09/2022, 04/09/2022      Health Maintenance:         Topic Date Due   • Colorectal Cancer Screening  Never done   • Breast Cancer Screening: Mammogram  01/07/2024   • Hepatitis C Screening  Completed         Topic Date Due   • Pneumococcal Vaccine: 65+ Years (1 - PCV) Never done   • COVID-19 Vaccine (6 - Pfizer series) 06/04/2022   • Influenza Vaccine (1) Never done      Medicare Screening Tests and Risk Assessments:     Larisa La is here for her Subsequent Wellness visit. Health Risk Assessment:   Patient rates overall health as very good. Patient feels that their physical health rating is same. Patient is satisfied with their life. Eyesight was rated as same.  Hearing was rated as same. Patient feels that their emotional and mental health rating is same. Patients states they are never, rarely angry. Patient states they are never, rarely unusually tired/fatigued. Pain experienced in the last 7 days has been none. Patient states that she has experienced no weight loss or gain in last 6 months. Depression Screening:   PHQ-2 Score: 0      Fall Risk Screening: In the past year, patient has experienced: no history of falling in past year      Urinary Incontinence Screening:   Patient has not leaked urine accidently in the last six months. Home Safety:  Patient does not have trouble with stairs inside or outside of their home. Patient has working smoke alarms and has working carbon monoxide detector. Home safety hazards include: none. Nutrition:   Current diet is Regular. Medications:   Patient is currently taking over-the-counter supplements. OTC medications include: see medication list. Patient is able to manage medications. Activities of Daily Living (ADLs)/Instrumental Activities of Daily Living (IADLs):   Walk and transfer into and out of bed and chair?: Yes  Dress and groom yourself?: Yes    Bathe or shower yourself?: Yes    Feed yourself?  Yes  Do your laundry/housekeeping?: Yes  Manage your money, pay your bills and track your expenses?: Yes  Make your own meals?: Yes    Do your own shopping?: Yes    Previous Hospitalizations:   Any hospitalizations or ED visits within the last 12 months?: No      Advance Care Planning:   Living will: No    Advanced directive counseling given: Yes    End of Life Decisions reviewed with patient: Yes    Provider agrees with end of life decisions: Yes      Cognitive Screening:   Provider or family/friend/caregiver concerned regarding cognition?: No    PREVENTIVE SCREENINGS      Cardiovascular Screening:    General: Screening Not Indicated and History Lipid Disorder      Diabetes Screening:     General: Screening Current Colorectal Cancer Screening:     General: Patient Declines      Breast Cancer Screening:     General: Screening Current      Cervical Cancer Screening:    General: Screening Not Indicated      Osteoporosis Screening:    General: Screening Not Indicated and History Osteoporosis      Abdominal Aortic Aneurysm (AAA) Screening:        General: Screening Not Indicated      Lung Cancer Screening:     General: Screening Not Indicated      Hepatitis C Screening:    General: Screening Current    Screening, Brief Intervention, and Referral to Treatment (SBIRT)    Screening      AUDIT-C Screenin) How often did you have a drink containing alcohol in the past year? 2 to 3 times a week  2) How many drinks did you have on a typical day when you were drinking in the past year? 1 to 2  3) How often did you have 6 or more drinks on one occasion in the past year? never    AUDIT-C Score: 3  Interpretation: Score 3-12 (female): POSITIVE screen for alcohol misuse    AUDIT Screenin) How often during the last year have you found that you were not able to stop drinking once you had started? 0 - never  5) How often during the last year have you failed to do what was normally expected from you because of drinking? 0 - never  6) How often during the last year have you needed a first drink in the morning to get yourself going after a heavy drinking session?  0 - never  7) How often during the last year have you had a feeling of guilt or remorse after drinking? 0 - never  8) How often during the last year have you been unable to remember what happened the night before because you had been drinking? 0 - never  9) Have you or someone else been injured as a result of your drinking? 0 - no  10) Has a relative or friend or a doctor or another health worker been concerned about your drinking or suggested you cut down? 0 - no    AUDIT Score: 3  Interpretation: Low risk alcohol consumption    Single Item Drug Screening:  How often have you used an illegal drug (including marijuana) or a prescription medication for non-medical reasons in the past year? never    Single Item Drug Screen Score: 0  Interpretation: Negative screen for possible drug use disorder    Brief Intervention  Alcohol & drug use screenings were reviewed. No concerns regarding substance use disorder identified. No results found. Physical Exam:     /70   Pulse 76   Temp 98.6 °F (37 °C)   Resp 16   Ht 5' 5" (1.651 m)   Wt 64 kg (141 lb)   BMI 23.46 kg/m²     Physical Exam  Vitals and nursing note reviewed. Constitutional:       Appearance: She is well-developed. HENT:      Head: Normocephalic and atraumatic. Right Ear: External ear normal.      Left Ear: External ear normal.      Nose: Nose normal.   Cardiovascular:      Rate and Rhythm: Normal rate and regular rhythm. Heart sounds: Normal heart sounds. No murmur heard. No friction rub. Pulmonary:      Effort: No respiratory distress. Breath sounds: Normal breath sounds. No wheezing or rales. Musculoskeletal:      Right lower leg: No edema. Left lower leg: No edema. Neurological:      Mental Status: She is oriented to person, place, and time. Cranial Nerves: No cranial nerve deficit.           Cecilia Robles, DO

## 2023-10-31 NOTE — PATIENT INSTRUCTIONS
Medicare Preventive Visit Patient Instructions  Thank you for completing your Welcome to Medicare Visit or Medicare Annual Wellness Visit today. Your next wellness visit will be due in one year (10/31/2024). The screening/preventive services that you may require over the next 5-10 years are detailed below. Some tests may not apply to you based off risk factors and/or age. Screening tests ordered at today's visit but not completed yet may show as past due. Also, please note that scanned in results may not display below. Preventive Screenings:  Service Recommendations Previous Testing/Comments   Colorectal Cancer Screening  * Colonoscopy    * Fecal Occult Blood Test (FOBT)/Fecal Immunochemical Test (FIT)  * Fecal DNA/Cologuard Test  * Flexible Sigmoidoscopy Age: 43-73 years old   Colonoscopy: every 10 years (may be performed more frequently if at higher risk)  OR  FOBT/FIT: every 1 year  OR  Cologuard: every 3 years  OR  Sigmoidoscopy: every 5 years  Screening may be recommended earlier than age 39 if at higher risk for colorectal cancer. Also, an individualized decision between you and your healthcare provider will decide whether screening between the ages of 77-80 would be appropriate. Colonoscopy: Not on file  FOBT/FIT: Not on file  Cologuard: Not on file  Sigmoidoscopy: Not on file          Breast Cancer Screening Age: 36 years old  Frequency: every 1-2 years  Not required if history of left and right mastectomy Mammogram: 01/07/2023    Screening Current   Cervical Cancer Screening Between the ages of 21-29, pap smear recommended once every 3 years. Between the ages of 32-69, can perform pap smear with HPV co-testing every 5 years.    Recommendations may differ for women with a history of total hysterectomy, cervical cancer, or abnormal pap smears in past. Pap Smear: 01/11/2023    Screening Not Indicated   Hepatitis C Screening Once for adults born between 1945 and 1965  More frequently in patients at high risk for Hepatitis C Hep C Antibody: 04/01/2022    Screening Current   Diabetes Screening 1-2 times per year if you're at risk for diabetes or have pre-diabetes Fasting glucose: 92 mg/dL (10/30/2023)  A1C: No results in last 5 years (No results in last 5 years)  Screening Current   Cholesterol Screening Once every 5 years if you don't have a lipid disorder. May order more often based on risk factors. Lipid panel: 10/30/2023    Screening Not Indicated  History Lipid Disorder     Other Preventive Screenings Covered by Medicare:  Abdominal Aortic Aneurysm (AAA) Screening: covered once if your at risk. You're considered to be at risk if you have a family history of AAA. Lung Cancer Screening: covers low dose CT scan once per year if you meet all of the following conditions: (1) Age 48-67; (2) No signs or symptoms of lung cancer; (3) Current smoker or have quit smoking within the last 15 years; (4) You have a tobacco smoking history of at least 20 pack years (packs per day multiplied by number of years you smoked); (5) You get a written order from a healthcare provider. Glaucoma Screening: covered annually if you're considered high risk: (1) You have diabetes OR (2) Family history of glaucoma OR (3)  aged 48 and older OR (3)  American aged 72 and older  Osteoporosis Screening: covered every 2 years if you meet one of the following conditions: (1) You're estrogen deficient and at risk for osteoporosis based off medical history and other findings; (2) Have a vertebral abnormality; (3) On glucocorticoid therapy for more than 3 months; (4) Have primary hyperparathyroidism; (5) On osteoporosis medications and need to assess response to drug therapy. Last bone density test (DXA Scan): 04/26/2021. HIV Screening: covered annually if you're between the age of 14-79. Also covered annually if you are younger than 13 and older than 72 with risk factors for HIV infection.  For pregnant patients, it is covered up to 3 times per pregnancy. Immunizations:  Immunization Recommendations   Influenza Vaccine Annual influenza vaccination during flu season is recommended for all persons aged >= 6 months who do not have contraindications   Pneumococcal Vaccine   * Pneumococcal conjugate vaccine = PCV13 (Prevnar 13), PCV15 (Vaxneuvance), PCV20 (Prevnar 20)  * Pneumococcal polysaccharide vaccine = PPSV23 (Pneumovax) Adults 33-27 yo with certain risk factors or if 69+ yo  If never received any pneumonia vaccine: recommend Prevnar 20 (PCV20)  Give PCV20 if previously received 1 dose of PCV13 or PPSV23   Hepatitis B Vaccine 3 dose series if at intermediate or high risk (ex: diabetes, end stage renal disease, liver disease)   Respiratory syncytial virus (RSV) Vaccine - COVERED BY MEDICARE PART D  * RSVPreF3 (Arexvy) CDC recommends that adults 61years of age and older may receive a single dose of RSV vaccine using shared clinical decision-making (SCDM)   Tetanus (Td) Vaccine - COST NOT COVERED BY MEDICARE PART B Following completion of primary series, a booster dose should be given every 10 years to maintain immunity against tetanus. Td may also be given as tetanus wound prophylaxis. Tdap Vaccine - COST NOT COVERED BY MEDICARE PART B Recommended at least once for all adults. For pregnant patients, recommended with each pregnancy.    Shingles Vaccine (Shingrix) - COST NOT COVERED BY MEDICARE PART B  2 shot series recommended in those 19 years and older who have or will have weakened immune systems or those 50 years and older     Health Maintenance Due:      Topic Date Due   • Colorectal Cancer Screening  Never done   • Breast Cancer Screening: Mammogram  01/07/2024   • Hepatitis C Screening  Completed     Immunizations Due:      Topic Date Due   • Pneumococcal Vaccine: 65+ Years (1 - PCV) Never done   • COVID-19 Vaccine (6 - Pfizer series) 06/04/2022   • Influenza Vaccine (1) Never done     Advance Directives   What are advance directives? Advance directives are legal documents that state your wishes and plans for medical care. These plans are made ahead of time in case you lose your ability to make decisions for yourself. Advance directives can apply to any medical decision, such as the treatments you want, and if you want to donate organs. What are the types of advance directives? There are many types of advance directives, and each state has rules about how to use them. You may choose a combination of any of the following:  Living will: This is a written record of the treatment you want. You can also choose which treatments you do not want, which to limit, and which to stop at a certain time. This includes surgery, medicine, IV fluid, and tube feedings. Durable power of  for [facility]): This is a written record that states who you want to make healthcare choices for you when you are unable to make them for yourself. This person, called a proxy, is usually a family member or a friend. You may choose more than 1 proxy. Do not resuscitate (DNR) order:  A DNR order is used in case your heart stops beating or you stop breathing. It is a request not to have certain forms of treatment, such as CPR. A DNR order may be included in other types of advance directives. Medical directive: This covers the care that you want if you are in a coma, near death, or unable to make decisions for yourself. You can list the treatments you want for each condition. Treatment may include pain medicine, surgery, blood transfusions, dialysis, IV or tube feedings, and a ventilator (breathing machine). Values history: This document has questions about your views, beliefs, and how you feel and think about life. This information can help others choose the care that you would choose. Why are advance directives important? An advance directive helps you control your care.  Although spoken wishes may be used, it is better to have your wishes written down. Spoken wishes can be misunderstood, or not followed. Treatments may be given even if you do not want them. An advance directive may make it easier for your family to make difficult choices about your care. Alcohol Use and Your Health    Drinking too much can harm your health. Excessive alcohol use leads to about 88,000 death in the [facility] each year, and shortens the life of those who diet by almost 30 years. Further, excessive drinking cost the economy $249 billion in 2010. Most excessive drinkers are not alcohol dependent. Excessive alcohol use has immediate effects that increase the risk of many harmful health conditions. These are most often the result of binge drinking. Over time, excessive alcohol use can lead to the development of chronic diseases and other series health problems. What is considered a "drink"? Excessive alcohol use includes:  Binge Drinking: For women, 4 or more drinks consumed on one occasion. For men, 5 or more drinks consumed on one occasion. Heavy Drinking: For women, 8 or more drinks per week. For men, 15 or more drinks per week  Any alcohol used by pregnant women  Any alcohol used by those under the age of 21 years    If you choose to drink, do so in moderation:  Do not drink at all if you are under the age of 24, or if you are or may be pregnant, or have health problems that could be made worse by drinking.   For women, up to 1 drink per day  For men, up to 2 drinks a day    No one should begin drinking or drink more frequently based on potential health benefits    Short-Term Health Risks:  Injuries: motor vehicle crashes, falls, drownings, burns  Violence: homicide, suicide, sexual assault, intimate partner violence  Alcohol poisoning  Reproductive health: risky sexual behaviors, unintended prengnacy, sexually transmitted diseases, miscarriage, stillbirth, fetal alcohol syndrome    Long-Term Health Risks:  Chronic diseases: high blood pressure, heart disease, stroke, liver disease, digestive problems  Cancers: breast, mouth and throat, liver, colon  Learning and memory problems: dementia, poor school performance  Mental health: depression, anxiety, insomnia  Social problems: lost productivity, family problems, unemployment  Alcohol dependence    For support and more information:  Substance Abuse and [address]  Web Address: https://[employer]/    Alcoholics Anonymous        Web Address: http://www.[employer].info/    https://www.cdc.gov/alcohol/fact-sheets/alcohol-use.htm     © [employer] 2018 Information is for End User's use only and may not be sold, redistributed or otherwise used for commercial purposes.  All illustrations and images included in CareNotes® are the copyrighted property of [employer] Inc[employer] or [address] [place] [address]

## 2023-11-24 ENCOUNTER — TELEPHONE (OUTPATIENT)
Age: 73
End: 2023-11-24

## 2023-11-24 NOTE — TELEPHONE ENCOUNTER
Patient would like her ears checked as she is experiencing an uncomfortable fullness. May need wax removal as OTC has not worked. Scheduled 12/15 but asked if she can be placed on a cancellation list for Dr. Bambi Yeager only.

## 2023-11-25 ENCOUNTER — APPOINTMENT (EMERGENCY)
Dept: RADIOLOGY | Facility: HOSPITAL | Age: 73
End: 2023-11-25
Payer: COMMERCIAL

## 2023-11-25 ENCOUNTER — HOSPITAL ENCOUNTER (EMERGENCY)
Facility: HOSPITAL | Age: 73
Discharge: HOME | End: 2023-11-25
Attending: EMERGENCY MEDICINE
Payer: COMMERCIAL

## 2023-11-25 VITALS
DIASTOLIC BLOOD PRESSURE: 70 MMHG | TEMPERATURE: 98.3 F | SYSTOLIC BLOOD PRESSURE: 165 MMHG | OXYGEN SATURATION: 97 % | HEART RATE: 68 BPM | RESPIRATION RATE: 16 BRPM

## 2023-11-25 DIAGNOSIS — S92.514A CLOSED NONDISPLACED FRACTURE OF PROXIMAL PHALANX OF LESSER TOE OF RIGHT FOOT, INITIAL ENCOUNTER: Primary | ICD-10-CM

## 2023-11-25 PROCEDURE — 99283 EMERGENCY DEPT VISIT LOW MDM: CPT

## 2023-11-25 PROCEDURE — 73660 X-RAY EXAM OF TOE(S): CPT

## 2023-11-25 PROCEDURE — 99284 EMERGENCY DEPT VISIT MOD MDM: CPT | Performed by: EMERGENCY MEDICINE

## 2023-11-25 NOTE — DISCHARGE INSTRUCTIONS
Call and schedule a follow-up appointment with the podiatrist, take ibuprofen or tylenol as needed and apply ice.

## 2023-11-25 NOTE — ED PROVIDER NOTES
History  Chief Complaint   Patient presents with    Toe Injury     Jammed L pinky toe on couch, thinks she broke it. Eddie Kelley is a 75year old female with a history of osteoporosis presenting for evaluation of a right little toe injury. Patient describes that she excellently stubbed the digit on a wooden bench today. She endorses swelling and tenderness at the site. She has a history of prior fracture of her left little toe. She denies any other injuries or areas of pain. There is no pain in her foot. Patient takes calcium daily for her osteoporosis. She did not take anything for pain prior to arrival and does not request any pain medications at this time. History provided by:  Patient   used: No        Prior to Admission Medications   Prescriptions Last Dose Informant Patient Reported? Taking? Ascorbic Acid (Vitamin C) 125 MG CHEW   Yes No   Sig: Chew   Multiple Vitamins-Minerals (multivitamin with minerals) tablet   Yes No   Sig: Take 1 tablet by mouth daily   VITAMIN D PO   Yes No   Sig: Take by mouth   calcium carbonate (OS-RICHARD) 600 MG tablet  Self Yes No   Sig: Take by mouth 2 (two) times a day   multivitamin (THERAGRAN) TABS  Self Yes No   Sig: Take by mouth daily   Patient not taking: Reported on 4/15/2023      Facility-Administered Medications: None       History reviewed. No pertinent past medical history.     Past Surgical History:   Procedure Laterality Date    KNEE SURGERY      TONSILECTOMY AND ADNOIDECTOMY         Family History   Problem Relation Age of Onset    Bone cancer Mother 62    Stomach cancer Mother     Coronary artery disease Father     No Known Problems Sister     Stomach cancer Maternal Grandmother     No Known Problems Maternal Grandfather     No Known Problems Paternal Grandmother     No Known Problems Paternal Grandfather     Cancer Maternal Aunt     No Known Problems Maternal Aunt     No Known Problems Maternal Aunt     No Known Problems Maternal Aunt No Known Problems Paternal Aunt     No Known Problems Paternal Aunt     No Known Problems Paternal Aunt      I have reviewed and agree with the history as documented. E-Cigarette/Vaping    E-Cigarette Use Never User      E-Cigarette/Vaping Substances    Nicotine No     THC No     CBD No     Flavoring No     Other No     Unknown No      Social History     Tobacco Use    Smoking status: Former     Packs/day: 0.50     Years: 25.00     Total pack years: 12.50     Types: Cigarettes     Start date:      Quit date:      Years since quittin.9     Passive exposure: Past    Smokeless tobacco: Never    Tobacco comments:     Started at age 24   sas/cma   Vaping Use    Vaping Use: Never used   Substance Use Topics    Alcohol use: Yes     Alcohol/week: 3.0 standard drinks of alcohol     Types: 3 Glasses of wine per week     Comment: social    Drug use: No        Review of Systems   Constitutional:  Negative for chills and fever. HENT:  Negative for ear pain and sore throat. Eyes:  Negative for pain and visual disturbance. Respiratory:  Negative for cough and shortness of breath. Cardiovascular:  Negative for chest pain and palpitations. Gastrointestinal:  Negative for abdominal pain and vomiting. Genitourinary:  Negative for dysuria and hematuria. Musculoskeletal:  Positive for arthralgias. Negative for back pain. Right little toe injury   Skin:  Negative for color change and rash. Neurological:  Negative for seizures and syncope. All other systems reviewed and are negative.       Physical Exam  ED Triage Vitals [23 1441]   Temperature Pulse Respirations Blood Pressure SpO2   98.3 °F (36.8 °C) 68 16 165/70 97 %      Temp Source Heart Rate Source Patient Position - Orthostatic VS BP Location FiO2 (%)   Oral Monitor -- Left arm --      Pain Score       --             Orthostatic Vital Signs  Vitals:    23 1441   BP: 165/70   Pulse: 68       Physical Exam  Vitals and nursing note reviewed. Constitutional:       Appearance: Normal appearance. HENT:      Head: Normocephalic and atraumatic. Mouth/Throat:      Mouth: Mucous membranes are moist.      Pharynx: Oropharynx is clear. Eyes:      General: No scleral icterus. Conjunctiva/sclera: Conjunctivae normal.   Cardiovascular:      Rate and Rhythm: Normal rate and regular rhythm. Heart sounds: Normal heart sounds. Pulmonary:      Effort: Pulmonary effort is normal. No respiratory distress. Breath sounds: Normal breath sounds. Abdominal:      General: Abdomen is flat. There is no distension. Tenderness: There is no abdominal tenderness. Musculoskeletal:         General: No signs of injury. Cervical back: Neck supple. No rigidity. Right foot: Normal capillary refill. Swelling, tenderness and bony tenderness present. Normal pulse. Left foot: Normal.      Comments: Patient has swelling and tenderness of her right fifth digit, pain is worse when you move the digit laterally, there is no tenderness proximally in her foot or metatarsals. Neurovascularly intact distally. Skin:     General: Skin is warm. Coloration: Skin is not jaundiced. Findings: No erythema or rash. Neurological:      General: No focal deficit present. Mental Status: She is alert. Mental status is at baseline. Psychiatric:         Mood and Affect: Mood normal.         Behavior: Behavior normal.         ED Medications  Medications - No data to display    Diagnostic Studies  Results Reviewed       None                   XR toe fifth min 2 views RIGHT   ED Interpretation by Megan Enrique DO (11/25 6752)   Fracture of fifth proximal tarsal bone            Procedures  Procedures      ED Course                                       Medical Decision Making  Kurt Coburn is a 75year old female with a history of osteoporosis presenting for evaluation of a right little toe injury.   Patient described that she excellently stubbed her toe on a wooden bench. No other reported injuries. On exam, patient did have swelling and tenderness to palpation of her right fifth toe. Neurovascularly intact distally. No other injuries. No tenderness of her foot or metatarsal bones. Concern for possible fracture of one of the tarsal bones and the fifth toe. X-ray did confirm that the patient has a fracture of the proximal bone in her fifth toe. Buddy tape was applied and she was provided with a firm shoe/sandal to help protect the digit. I referred her to podiatry and provided her with contact information, advised her to take ibuprofen or Tylenol as needed and apply ice, I gave strict return precautions, she was understanding and agreeable to plan. Problems Addressed:  Closed nondisplaced fracture of proximal phalanx of lesser toe of right foot, initial encounter: acute illness or injury    Amount and/or Complexity of Data Reviewed  Radiology: ordered and independent interpretation performed. Disposition  Final diagnoses:   Closed nondisplaced fracture of proximal phalanx of lesser toe of right foot, initial encounter     Time reflects when diagnosis was documented in both MDM as applicable and the Disposition within this note       Time User Action Codes Description Comment    11/25/2023  4:23 PM Geronimo España Add [S92.514A] Closed nondisplaced fracture of proximal phalanx of lesser toe of right foot, initial encounter           ED Disposition       ED Disposition   Discharge    Condition   Stable    Date/Time   Sat Nov 25, 2023  4:23 PM    Comment   [address] [facility] discharge to home/self care.                    Follow-up Information       Follow up With Specialties Details Why Contact Info Additional Information    [address] [place] [address] Podiatry Schedule an appointment as soon as possible for a visit   [address] [place] [id number]  [address] [place] [address] [address] [place], [place], [address] [facility]            Discharge Medication List as of 11/25/2023  4:24 PM        CONTINUE these medications which have NOT CHANGED    Details   Ascorbic Acid (Vitamin C) 125 MG CHEW Chew, Historical Med      calcium carbonate (OS-RICHARD) 600 MG tablet Take by mouth 2 (two) times a day, Starting Tue 7/5/2005, Historical Med      Multiple Vitamins-Minerals (multivitamin with minerals) tablet Take 1 tablet by mouth daily, Historical Med      multivitamin (THERAGRAN) TABS Take by mouth daily, Starting Tue 7/5/2005, Historical Med      VITAMIN D PO Take by mouth, Historical Med               PDMP Review       None             ED Provider  Attending physically available and evaluated Jason Whitehead. I managed the patient along with the ED Attending.     Electronically Signed by           Mary Morrissey DO  11/25/23 1330

## 2023-11-27 ENCOUNTER — OFFICE VISIT (OUTPATIENT)
Age: 73
End: 2023-11-27
Payer: COMMERCIAL

## 2023-11-27 ENCOUNTER — TELEPHONE (OUTPATIENT)
Age: 73
End: 2023-11-27

## 2023-11-27 VITALS
HEART RATE: 75 BPM | WEIGHT: 141 LBS | HEIGHT: 65 IN | BODY MASS INDEX: 23.49 KG/M2 | DIASTOLIC BLOOD PRESSURE: 91 MMHG | SYSTOLIC BLOOD PRESSURE: 143 MMHG

## 2023-11-27 DIAGNOSIS — M20.11 HALLUX ABDUCTOVALGUS WITH BUNIONS, RIGHT: Primary | ICD-10-CM

## 2023-11-27 DIAGNOSIS — S92.514A CLOSED NONDISPLACED FRACTURE OF PROXIMAL PHALANX OF LESSER TOE OF RIGHT FOOT, INITIAL ENCOUNTER: ICD-10-CM

## 2023-11-27 DIAGNOSIS — S92.504D CLOSED NONDISPLACED FRACTURE OF PHALANX OF LESSER TOE OF RIGHT FOOT WITH ROUTINE HEALING, UNSPECIFIED PHALANX, SUBSEQUENT ENCOUNTER: Primary | ICD-10-CM

## 2023-11-27 DIAGNOSIS — M21.611 HALLUX ABDUCTOVALGUS WITH BUNIONS, RIGHT: Primary | ICD-10-CM

## 2023-11-27 PROCEDURE — 99203 OFFICE O/P NEW LOW 30 MIN: CPT | Performed by: STUDENT IN AN ORGANIZED HEALTH CARE EDUCATION/TRAINING PROGRAM

## 2023-11-27 NOTE — PROGRESS NOTES
This patient was seen on 11/27/2023. My role is Foot , Ankle, and Wound Specialist    ASSESSMENT     Diagnoses and all orders for this visit:    Hallux abductovalgus with bunions, right    Closed nondisplaced fracture of proximal phalanx of lesser toe of right foot, initial encounter  -     Ambulatory Referral to [facility]         Problem List Items Addressed This Visit          Musculoskeletal and Integument    Closed nondisplaced fracture of proximal phalanx of lesser toe of right foot, initial encounter    Relevant Orders    Post Op Shoe     Other Visit Diagnoses       Hallux abductovalgus with bunions, right    -  Primary          PLAN  -Alyssa and I discussed her right foot  -Zully splinting performed to the right fourth and fifth digits  -Weightbearing as tolerated in surgical shoe  -Return to clinic 4 weeks for repeat x-rays    X-ray of the right foot from 11/25/2023 reviewed: Oblique fracture of the fifth proximal phalanx noted with minimal dorsal displacement. SUBJECTIVE    Chief Complaint:  Right fifth digit pain     Patient ID: Felicita Zhong     11/27/2023: Chay Gonzales is a pleasant 65-year-old female who presents today with right fifth digit pain. She states that she broke her left fifth digit approximately 5 or 6 years ago. She states that more recently she was going around a corner when she stubbed her right fifth toe on a bench. She states this occurred Saturday morning. She states that she noticed immediate pain however no real swelling or bruising to the right fifth digit. She has already tried some zully splinting. She has not taken any pain medication. The following portions of the patient's history were reviewed and updated as appropriate: allergies, current medications, past family history, past medical history, past social history, past surgical history and problem list.    Review of Systems   Constitutional: Negative. HENT: Negative.      Respiratory: Negative. Cardiovascular: Negative. Gastrointestinal: Negative. Musculoskeletal:  Positive for arthralgias. Skin: Negative. Neurological: Negative. OBJECTIVE      /91   Pulse 75   Ht 5' 5" (1.651 m)   Wt 64 kg (141 lb)   BMI 23.46 kg/m²        Physical Exam  Constitutional:       Appearance: Normal appearance. HENT:      Head: Normocephalic and atraumatic. Eyes:      General:         Right eye: No discharge. Left eye: No discharge. Cardiovascular:      Rate and Rhythm: Normal rate and regular rhythm. Pulses:           Dorsalis pedis pulses are 2+ on the right side and 2+ on the left side. Posterior tibial pulses are 2+ on the right side and 2+ on the left side. Pulmonary:      Effort: Pulmonary effort is normal.      Breath sounds: Normal breath sounds. Skin:     General: Skin is warm. Capillary Refill: Capillary refill takes less than 2 seconds. Neurological:      Sensory: Sensation is intact. No sensory deficit. Vascular:  -DP and PT pulses intact b/l  -Capillary refill time <2 sec b/l  -Digital hair growth: Present  -Skin temp: WNL    MSK:  -Pain on palpation right fifth digit  -Hallux abductovalgus deformity noted to right foot with abduction of right hallux and abutment against second digit.   -MMT is 5/5 to all muscle compartments of the lower extremity  -Ankle dorsiflexion >10 degrees with knee extended and knee flexed b/l    Neuro:  -Light sensation intact bilaterally  -Protective sensation intact bilaterally with 10/10 points felt with Ezekiel Rival monofilament    Derm:  -No lesions, abrasions, or open wounds noted  -No noted interdigital maceration, peeling, malodor  -No callus formation noted on exam

## 2023-11-27 NOTE — TELEPHONE ENCOUNTER
PT broke her right little toe over the weekend, and was told by the ER that she needed to f/u with s podiatrist and is inquiring which podiatrist Dr Bambi Yeager would recommend. Please f/u with PT to inform her.

## 2023-11-30 ENCOUNTER — TELEPHONE (OUTPATIENT)
Age: 73
End: 2023-11-30

## 2023-11-30 NOTE — TELEPHONE ENCOUNTER
Caller: Paresh Galvez    Doctor: [address]    Reason for call: Patient would like to go to PT. She would like to exersise w/o using her foot. What can she do?     Call back#: [phone]

## 2023-11-30 NOTE — ED ATTENDING ATTESTATION
11/25/2023  IJacinda MD, saw and evaluated the patient. I have discussed the patient with the resident/non-physician practitioner and agree with the resident's/non-physician practitioner's findings, Plan of Care, and MDM as documented in the resident's/non-physician practitioner's note, except where noted. All available labs and Radiology studies were reviewed. I was present for key portions of any procedure(s) performed by the resident/non-physician practitioner and I was immediately available to provide assistance. At this point I agree with the current assessment done in the Emergency Department. I have conducted an independent evaluation of this patient a history and physical is as follows:    ED Course  ED Course as of 11/30/23 1121   Sat Nov 25, 2023   125 75year old female with right 5th toe pain after catching her foot on carpet. No PMH. Patient reports pain with ambulation, palpation and standing on foot,  Did not fall, hit head or lose consciousness. No other symptoms. VS WNL. Physical exam remarkable for swollen, slightly red painful fifth right toe. DD at risk for fracture, dislocation, sprain, hematoma. XR ordered. Declines analgesia. XR toe fifth min 2 views RIGHT  Fracture noted, fifth digit   1626 Cm tape + boot applied. Patient declines analgesia. Patient feels comfortable ambulating in boot. Patient remained clinically stable in the ED. Strict return precautions given. Patient verbalized understanding and will follow up as outpatient with podiatry. Upon discharge, patient was Henretta Don, and fully ambulatory.          Critical Care Time  Procedures

## 2023-12-18 ENCOUNTER — OFFICE VISIT (OUTPATIENT)
Age: 73
End: 2023-12-18
Payer: COMMERCIAL

## 2023-12-18 ENCOUNTER — APPOINTMENT (OUTPATIENT)
Dept: RADIOLOGY | Facility: CLINIC | Age: 73
End: 2023-12-18
Payer: COMMERCIAL

## 2023-12-18 VITALS
SYSTOLIC BLOOD PRESSURE: 146 MMHG | DIASTOLIC BLOOD PRESSURE: 79 MMHG | HEART RATE: 69 BPM | BODY MASS INDEX: 23.49 KG/M2 | HEIGHT: 65 IN | WEIGHT: 141 LBS

## 2023-12-18 DIAGNOSIS — S92.514D CLOSED NONDISPLACED FRACTURE OF PROXIMAL PHALANX OF LESSER TOE OF RIGHT FOOT WITH ROUTINE HEALING, SUBSEQUENT ENCOUNTER: Primary | ICD-10-CM

## 2023-12-18 DIAGNOSIS — S92.514A CLOSED NONDISPLACED FRACTURE OF PROXIMAL PHALANX OF LESSER TOE OF RIGHT FOOT, INITIAL ENCOUNTER: ICD-10-CM

## 2023-12-18 PROCEDURE — 99213 OFFICE O/P EST LOW 20 MIN: CPT | Performed by: STUDENT IN AN ORGANIZED HEALTH CARE EDUCATION/TRAINING PROGRAM

## 2023-12-18 PROCEDURE — 73630 X-RAY EXAM OF FOOT: CPT

## 2023-12-19 NOTE — PROGRESS NOTES
This patient was seen on 12/18/2023.    My role is Foot , Ankle, and Wound Specialist    ASSESSMENT     Diagnoses and all orders for this visit:    Closed nondisplaced fracture of proximal phalanx of lesser toe of right foot, initial encounter  -     XR foot 3+ vw right; Future         Problem List Items Addressed This Visit          Musculoskeletal and Integument    Closed nondisplaced fracture of proximal phalanx of lesser toe of right foot, initial encounter - Primary    Relevant Orders    XR foot 3+ vw right (Completed)     PLAN  -Patient and I discussed their foot in detail  -Continue rest, ice, elevation  -Tylenol/Motrin as needed for pain  -Buddy splinting performed to fracture digit and digit adjacent  -Return to clinic 6 weeks for repeat x-rays    X-ray of the right foot from 12/18/2023 Interpreted independently: Oblique fracture of the fifth proximal phalanx of the right foot still noted.  No change in alignment and no callus formation noted.    SUBJECTIVE    Chief Complaint:  Right fifth digit pain     Patient ID: Vilma Brar     11/27/2023: Alyssa is a pleasant 73-year-old female who presents today with right fifth digit pain.  She states that she broke her left fifth digit approximately 5 or 6 years ago.  She states that more recently she was going around a corner when she stubbed her right fifth toe on a bench.  She states this occurred Saturday morning.  She states that she noticed immediate pain however no real swelling or bruising to the right fifth digit.  She has already tried some buddy splinting.  She has not taken any pain medication.    12/18/2023: Alyssa continues to the right pinky toe is still causing her pain she states that she has been wearing normal sneakers and no longer buddy splinting her toe.  She is worried that she may have reinjured the area as pain has returned to her right fifth digit.        The following portions of the patient's history were reviewed and updated as  "appropriate: allergies, current medications, past family history, past medical history, past social history, past surgical history and problem list.    Review of Systems   Constitutional: Negative.    HENT: Negative.     Respiratory: Negative.     Cardiovascular: Negative.    Gastrointestinal: Negative.    Musculoskeletal:  Positive for arthralgias.   Skin: Negative.    Neurological: Negative.          OBJECTIVE      /79   Pulse 69   Ht 5' 5\" (1.651 m)   Wt 64 kg (141 lb)   BMI 23.46 kg/m²        Physical Exam  Constitutional:       Appearance: Normal appearance.   HENT:      Head: Normocephalic and atraumatic.   Eyes:      General:         Right eye: No discharge.         Left eye: No discharge.   Cardiovascular:      Rate and Rhythm: Normal rate and regular rhythm.      Pulses:           Dorsalis pedis pulses are 2+ on the right side and 2+ on the left side.        Posterior tibial pulses are 2+ on the right side and 2+ on the left side.   Pulmonary:      Effort: Pulmonary effort is normal.      Breath sounds: Normal breath sounds.   Skin:     General: Skin is warm.      Capillary Refill: Capillary refill takes less than 2 seconds.   Neurological:      Sensory: Sensation is intact. No sensory deficit.         Vascular:  -DP and PT pulses intact b/l  -Capillary refill time <2 sec b/l  -Digital hair growth: Present  -Skin temp: WNL     MSK:  -Pain on palpation right fifth digit  -Hallux abductovalgus deformity noted to right foot with abduction of right hallux and abutment against second digit.  -MMT is 5/5 to all muscle compartments of the lower extremity  -Ankle dorsiflexion >10 degrees with knee extended and knee flexed b/l     Neuro:  -Light sensation intact bilaterally  -Protective sensation intact bilaterally with 10/10 points felt with [place] Addie monofilament     Derm:  -No lesions, abrasions, or open wounds noted  -No noted interdigital maceration, peeling, malodor  -No callus formation noted " on exam

## 2023-12-21 ENCOUNTER — OFFICE VISIT (OUTPATIENT)
Dept: FAMILY MEDICINE CLINIC | Facility: CLINIC | Age: 73
End: 2023-12-21
Payer: COMMERCIAL

## 2023-12-21 VITALS
SYSTOLIC BLOOD PRESSURE: 124 MMHG | TEMPERATURE: 97.2 F | HEIGHT: 65 IN | DIASTOLIC BLOOD PRESSURE: 70 MMHG | HEART RATE: 76 BPM | RESPIRATION RATE: 16 BRPM | BODY MASS INDEX: 23.46 KG/M2

## 2023-12-21 DIAGNOSIS — F32.A DEPRESSION, UNSPECIFIED DEPRESSION TYPE: ICD-10-CM

## 2023-12-21 DIAGNOSIS — H91.90 CHANGE IN HEARING, UNSPECIFIED LATERALITY: ICD-10-CM

## 2023-12-21 DIAGNOSIS — Z12.31 SCREENING MAMMOGRAM, ENCOUNTER FOR: ICD-10-CM

## 2023-12-21 DIAGNOSIS — S92.504D CLOSED NONDISPLACED FRACTURE OF PHALANX OF LESSER TOE OF RIGHT FOOT WITH ROUTINE HEALING, UNSPECIFIED PHALANX, SUBSEQUENT ENCOUNTER: ICD-10-CM

## 2023-12-21 DIAGNOSIS — H61.22 IMPACTED CERUMEN OF LEFT EAR: Primary | ICD-10-CM

## 2023-12-21 PROCEDURE — 69210 REMOVE IMPACTED EAR WAX UNI: CPT | Performed by: FAMILY MEDICINE

## 2023-12-21 PROCEDURE — 99214 OFFICE O/P EST MOD 30 MIN: CPT | Performed by: FAMILY MEDICINE

## 2023-12-21 RX ORDER — MAGNESIUM 30 MG
30 TABLET ORAL DAILY
COMMUNITY

## 2023-12-21 NOTE — PROGRESS NOTES
"Name: Vilma Brar      : 1950      MRN: [id number]  Encounter Provider: Jeni Lutz DO  Encounter Date: 2023   Encounter department: [facility]    Assessment & Plan     1. Impacted cerumen of left ear  -     Ear cerumen removal    2. Closed nondisplaced fracture of phalanx of lesser toe of right foot with routine healing, unspecified phalanx, subsequent encounter  Comments:  improving, working with podiatry  Orders:  -     Ambulatory referral to Podiatry    3. Depression, unspecified depression type  -     Ambulatory referral to Psych Services; Future    4. Screening mammogram, encounter for  -     Mammo screening bilateral w 3d & cad; Future; Expected date: 2024    5. Change in hearing, unspecified laterality  -     Ambulatory Referral to Otolaryngology; Future    Declined colonoscopy       Subjective      She has been having issues with vibration noise.  It started a few months ago.  She is hearing in both ears.    She also has been having a lot of stress.  She is interested in talking to a therapist.  She has been feeling depressed    She reports that her toe fracture has been getting better.  Not being able to walk around as much has been not helping her mood.  She likes her new podiatrist      Review of Systems    Current Outpatient Medications on File Prior to Visit   Medication Sig   • Ascorbic Acid (Vitamin C) 125 MG CHEW Chew in the morning   • calcium carbonate (OS-RICHARD) 600 MG tablet Take by mouth 2 (two) times a day   • magnesium 30 MG tablet Take 30 mg by mouth in the morning   • Multiple Vitamins-Minerals (multivitamin with minerals) tablet Take 1 tablet by mouth daily   • VITAMIN D PO Take by mouth in the morning   • [DISCONTINUED] multivitamin (THERAGRAN) TABS Take by mouth daily (Patient not taking: Reported on 4/15/2023)       Objective     /70   Pulse 76   Temp (!) 97.2 °F (36.2 °C)   Resp 16   Ht 5' 5\" (1.651 m)   BMI 23.46 kg/m²     Physical " Exam  Vitals and nursing note reviewed.   Constitutional:       Appearance: She is well-developed.   HENT:      Head: Normocephalic and atraumatic.      Right Ear: Tympanic membrane and external ear normal.      Left Ear: Tympanic membrane and external ear normal. There is impacted cerumen.   Cardiovascular:      Rate and Rhythm: Normal rate and regular rhythm.      Heart sounds: Normal heart sounds. No murmur heard.     No friction rub.   Pulmonary:      Effort: Pulmonary effort is normal. No respiratory distress.      Breath sounds: Normal breath sounds. No wheezing or rales.   Musculoskeletal:      Right lower leg: No edema.      Left lower leg: No edema.         Ear cerumen removal    Date/Time: 12/21/2023 11:00 AM    Performed by: Jnei Lutz DO  Authorized by: Jeni Lutz DO  Universal Protocol:  Consent: Verbal consent obtained.  Risks and benefits: risks, benefits and alternatives were discussed  Consent given by: patient  Patient understanding: patient states understanding of the procedure being performed    Patient location:  Clinic  Procedure details:     Location:  L ear    Procedure type: irrigation with instrumentation      Instrumentation: curette      Approach:  External  Post-procedure details:     Complication:  None    Patient tolerance of procedure:  Tolerated well, no immediate complications  Comments:      Hearing did not change.  Procedure was only partially successful       Jeni Lutz DO

## 2024-01-10 ENCOUNTER — TELEPHONE (OUTPATIENT)
Dept: PSYCHIATRY | Facility: CLINIC | Age: 74
End: 2024-01-10

## 2024-01-10 NOTE — TELEPHONE ENCOUNTER
Called pt in regards to referral rcvd, verify needs of services and inform of current wait list. Pt interested int alk therapy services. Pt added to proper wait list. Closing referral.

## 2024-01-18 ENCOUNTER — OFFICE VISIT (OUTPATIENT)
Age: 74
End: 2024-01-18
Payer: COMMERCIAL

## 2024-01-18 ENCOUNTER — APPOINTMENT (OUTPATIENT)
Dept: RADIOLOGY | Facility: CLINIC | Age: 74
End: 2024-01-18
Payer: COMMERCIAL

## 2024-01-18 VITALS
HEIGHT: 65 IN | DIASTOLIC BLOOD PRESSURE: 78 MMHG | SYSTOLIC BLOOD PRESSURE: 149 MMHG | WEIGHT: 140 LBS | BODY MASS INDEX: 23.32 KG/M2 | HEART RATE: 80 BPM

## 2024-01-18 DIAGNOSIS — S92.514D CLOSED NONDISPLACED FRACTURE OF PROXIMAL PHALANX OF LESSER TOE OF RIGHT FOOT WITH ROUTINE HEALING, SUBSEQUENT ENCOUNTER: Primary | ICD-10-CM

## 2024-01-18 DIAGNOSIS — S92.514D CLOSED NONDISPLACED FRACTURE OF PROXIMAL PHALANX OF LESSER TOE OF RIGHT FOOT WITH ROUTINE HEALING, SUBSEQUENT ENCOUNTER: ICD-10-CM

## 2024-01-18 PROCEDURE — 73630 X-RAY EXAM OF FOOT: CPT

## 2024-01-18 PROCEDURE — 99212 OFFICE O/P EST SF 10 MIN: CPT | Performed by: STUDENT IN AN ORGANIZED HEALTH CARE EDUCATION/TRAINING PROGRAM

## 2024-01-19 NOTE — PROGRESS NOTES
This patient was seen on 1/18/2024.    My role is Foot , Ankle, and Wound Specialist    ASSESSMENT     Diagnoses and all orders for this visit:    Closed nondisplaced fracture of proximal phalanx of lesser toe of right foot with routine healing, subsequent encounter  -     X-ray foot right 3+ views; Future         Problem List Items Addressed This Visit    None  Visit Diagnoses       Closed nondisplaced fracture of proximal phalanx of lesser toe of right foot with routine healing, subsequent encounter    -  Primary    Relevant Orders    X-ray foot right 3+ views              PLAN  -Patient and I discussed their foot in detail  -Tylenol/Motrin as needed for pain  -No restrictions with activity  -Return to clinic as needed for new or worsening podiatric concerns    X-ray of right foot from 1/18/2024 interpreted independently: Interval healing of fifth proximal phalanx fracture with callus formation noted both medially and laterally surrounding the fracture site.    SUBJECTIVE    Chief Complaint:  Right 5th digit fracture     Patient ID: Vilma Brar     11/27/2023: Alyssa is a pleasant 73-year-old female who presents today with right fifth digit pain.  She states that she broke her left fifth digit approximately 5 or 6 years ago.  She states that more recently she was going around a corner when she stubbed her right fifth toe on a bench.  She states this occurred Saturday morning.  She states that she noticed immediate pain however no real swelling or bruising to the right fifth digit.  She has already tried some buddy splinting.  She has not taken any pain medication.    1/18/2024: Alyssa states that her right fifth digit is much less painful today compared to her previous visit.  She states that she can still feel a small twinge of pain every once a while to the area but by enlarge is feeling much better.  She plans to go to [place] soon for a golf trip.        The following portions of the patient's history were  "reviewed and updated as appropriate: allergies, current medications, past family history, past medical history, past social history, past surgical history and problem list.    Review of Systems   Constitutional: Negative.    HENT: Negative.     Respiratory: Negative.     Cardiovascular: Negative.    Gastrointestinal: Negative.    Musculoskeletal:  Negative for arthralgias.   Skin: Negative.    Neurological: Negative.          OBJECTIVE      /78   Pulse 80   Ht 5' 5\" (1.651 m) Comment: verbal  Wt 63.5 kg (140 lb) Comment: verbal  BMI 23.30 kg/m²        Physical Exam  Constitutional:       Appearance: Normal appearance.   HENT:      Head: Normocephalic and atraumatic.   Eyes:      General:         Right eye: No discharge.         Left eye: No discharge.   Cardiovascular:      Rate and Rhythm: Normal rate and regular rhythm.      Pulses:           Dorsalis pedis pulses are 2+ on the right side and 2+ on the left side.        Posterior tibial pulses are 2+ on the right side and 2+ on the left side.   Pulmonary:      Effort: Pulmonary effort is normal.      Breath sounds: Normal breath sounds.   Skin:     General: Skin is warm.      Capillary Refill: Capillary refill takes less than 2 seconds.   Neurological:      Sensory: Sensation is intact. No sensory deficit.         Vascular:  -DP and PT pulses intact b/l  -Capillary refill time <2 sec b/l  -Digital hair growth: Present  -Skin temp: WNL     MSK:  -No pain on palpation today  -Hallux abductovalgus deformity noted to right foot with abduction of right hallux and abutment against second digit.  -MMT is 5/5 to all muscle compartments of the lower extremity  -Ankle dorsiflexion >10 degrees with knee extended and knee flexed b/l     Neuro:  -Light sensation intact bilaterally  -Protective sensation intact bilaterally with 10/10 points felt with [place]ie monofilament     Derm:  -No lesions, abrasions, or open wounds noted  -No noted interdigital " maceration, peeling, malodor  -No callus formation noted on exam

## 2024-04-08 ENCOUNTER — OFFICE VISIT (OUTPATIENT)
Dept: FAMILY MEDICINE CLINIC | Facility: CLINIC | Age: 74
End: 2024-04-08
Payer: COMMERCIAL

## 2024-04-08 VITALS
RESPIRATION RATE: 18 BRPM | TEMPERATURE: 97.2 F | WEIGHT: 143.8 LBS | SYSTOLIC BLOOD PRESSURE: 138 MMHG | DIASTOLIC BLOOD PRESSURE: 80 MMHG | HEIGHT: 65 IN | HEART RATE: 78 BPM | BODY MASS INDEX: 23.96 KG/M2

## 2024-04-08 DIAGNOSIS — E78.2 MIXED HYPERLIPIDEMIA: ICD-10-CM

## 2024-04-08 DIAGNOSIS — M71.21 BAKER CYST, RIGHT: Primary | ICD-10-CM

## 2024-04-08 DIAGNOSIS — I87.2 VENOUS INSUFFICIENCY: ICD-10-CM

## 2024-04-08 DIAGNOSIS — Z13.0 SCREENING FOR DEFICIENCY ANEMIA: ICD-10-CM

## 2024-04-08 PROBLEM — S92.514A CLOSED NONDISPLACED FRACTURE OF PROXIMAL PHALANX OF LESSER TOE OF RIGHT FOOT, INITIAL ENCOUNTER: Status: RESOLVED | Noted: 2023-11-27 | Resolved: 2024-04-08

## 2024-04-08 PROCEDURE — G2211 COMPLEX E/M VISIT ADD ON: HCPCS | Performed by: FAMILY MEDICINE

## 2024-04-08 PROCEDURE — 99214 OFFICE O/P EST MOD 30 MIN: CPT | Performed by: FAMILY MEDICINE

## 2024-04-08 RX ORDER — SODIUM FLUORIDE 1.1 G/100G
CREAM ORAL DAILY
COMMUNITY
Start: 2024-01-12

## 2024-04-08 NOTE — PROGRESS NOTES
Name: Vilma Brar      : 1950      MRN: [id number]  Encounter Provider: Jeni Lutz DO  Encounter Date: 2024   Encounter department: [facility]    Assessment & Plan     1. Baker cyst, right  Comments:  Pain is improved with injection  Will follow-up with orthopedist if needed    2. Venous insufficiency  Assessment & Plan:  No current symptoms  Recommend that she continue compression and will see vascular specialist this week      3. Mixed hyperlipidemia  Assessment & Plan:  Last levels had improved with diet  Will recheck labs before her next appointment    Orders:  -     Comprehensive metabolic panel; Future; Expected date: 2024  -     Lipid Panel with Direct LDL reflex; Future; Expected date: 2024    4. Screening for deficiency anemia  -     CBC; Future; Expected date: 2024       Return in about 7 months (around 10/31/2024) for AWV/Follow up.  Subjective      She has been having pain in her right knee.  She went to the ER and was diagnosed with a Baker's cyst. She then saw an orthopedist in [place].  She had a cortisone injection and the pain improved.    Then the orthopedist sent her to a venous specialist.  She has venous insufficiency.  They have recommended compression and intervention for a varicose vein.  She has decided to see a specialist up in this area.  She has an appointment with Jason in 2 days.          Review of Systems    Current Outpatient Medications on File Prior to Visit   Medication Sig   • Ascorbic Acid (Vitamin C) 125 MG CHEW Chew in the morning   • calcium carbonate (OS-RICHARD) 600 MG tablet Take by mouth 2 (two) times a day   • Denta 5000 Plus 1.1 % CREA in the morning   • magnesium 30 MG tablet Take 30 mg by mouth in the morning   • Multiple Vitamins-Minerals (multivitamin with minerals) tablet Take 1 tablet by mouth daily   • VITAMIN D PO Take by mouth in the morning       Objective     /80   Pulse 78   Temp (!) 97.2 °F (36.2 °C)    "Resp 18   Ht 5' 5\" (1.651 m)   Wt 65.2 kg (143 lb 12.8 oz)   BMI 23.93 kg/m²     Physical Exam  Vitals and nursing note reviewed.   Constitutional:       Appearance: She is well-developed.   HENT:      Head: Normocephalic and atraumatic.      Right Ear: Tympanic membrane and external ear normal.      Left Ear: Tympanic membrane and external ear normal.   Cardiovascular:      Rate and Rhythm: Normal rate and regular rhythm.      Heart sounds: Normal heart sounds. No murmur heard.     No friction rub.   Pulmonary:      Effort: Pulmonary effort is normal. No respiratory distress.      Breath sounds: Normal breath sounds. No wheezing or rales.   Musculoskeletal:      Right lower leg: No edema.      Left lower leg: No edema.       Jeni Lutz, DO    "

## 2024-04-08 NOTE — ASSESSMENT & PLAN NOTE
No current symptoms  Recommend that she continue compression and will see vascular specialist this week

## 2024-04-15 ENCOUNTER — TELEPHONE (OUTPATIENT)
Age: 74
End: 2024-04-15

## 2024-04-15 NOTE — TELEPHONE ENCOUNTER
Yes she can take the biotin.  Please ask her to stop it 3 days before getting lab work done  Thank you,  Jeni Lutz, DO

## 2024-04-15 NOTE — TELEPHONE ENCOUNTER
Patient called in and would like to start OTC biotin supplement. She wanted to check with PCP if it is okay after reading Biotin can alter lab values. Please follow up with patient with provider's advice.

## 2024-04-17 ENCOUNTER — APPOINTMENT (OUTPATIENT)
Dept: RADIOLOGY | Facility: CLINIC | Age: 74
End: 2024-04-17
Payer: COMMERCIAL

## 2024-04-17 ENCOUNTER — OFFICE VISIT (OUTPATIENT)
Age: 74
End: 2024-04-17
Payer: COMMERCIAL

## 2024-04-17 ENCOUNTER — TELEPHONE (OUTPATIENT)
Age: 74
End: 2024-04-17

## 2024-04-17 VITALS
WEIGHT: 143 LBS | HEIGHT: 65 IN | BODY MASS INDEX: 23.82 KG/M2 | DIASTOLIC BLOOD PRESSURE: 74 MMHG | HEART RATE: 70 BPM | SYSTOLIC BLOOD PRESSURE: 120 MMHG

## 2024-04-17 DIAGNOSIS — M79.673 PAIN OF FOOT, UNSPECIFIED LATERALITY: ICD-10-CM

## 2024-04-17 DIAGNOSIS — M79.673 PAIN OF FOOT, UNSPECIFIED LATERALITY: Primary | ICD-10-CM

## 2024-04-17 DIAGNOSIS — L84 CALLUS OF TOE: ICD-10-CM

## 2024-04-17 DIAGNOSIS — M20.5X2 ADDUCTOVARUS ROTATION OF TOE, ACQUIRED, LEFT: ICD-10-CM

## 2024-04-17 PROCEDURE — 73630 X-RAY EXAM OF FOOT: CPT

## 2024-04-17 PROCEDURE — 99213 OFFICE O/P EST LOW 20 MIN: CPT | Performed by: STUDENT IN AN ORGANIZED HEALTH CARE EDUCATION/TRAINING PROGRAM

## 2024-04-17 NOTE — TELEPHONE ENCOUNTER
Caller: Alyssa Brar    Doctor and/or Office: Dr. Willingham/[place]    #: [phone]    Escalation: Care/At appt. Today  Recommended a moisturizer for her feet-one was Gold Downs but cannot remember the other name and note is not complete for me to tell her what he said. She said there are so many varieties of Gold Downs she has no idea which to purchase. Please ask  and call her back/if she does not answer please leave info on her VM. Thanks

## 2024-04-19 ENCOUNTER — HOSPITAL ENCOUNTER (OUTPATIENT)
Dept: ULTRASOUND IMAGING | Facility: CLINIC | Age: 74
Discharge: HOME | End: 2024-04-19
Payer: COMMERCIAL

## 2024-04-19 ENCOUNTER — HOSPITAL ENCOUNTER (OUTPATIENT)
Dept: MAMMOGRAPHY | Facility: CLINIC | Age: 74
Discharge: HOME | End: 2024-04-19
Payer: COMMERCIAL

## 2024-04-19 VITALS — BODY MASS INDEX: 23.82 KG/M2 | WEIGHT: 143 LBS | HEIGHT: 65 IN

## 2024-04-19 DIAGNOSIS — Z12.31 SCREENING MAMMOGRAM, ENCOUNTER FOR: ICD-10-CM

## 2024-04-19 DIAGNOSIS — R92.30 DENSE BREAST: ICD-10-CM

## 2024-04-19 PROCEDURE — 77063 BREAST TOMOSYNTHESIS BI: CPT

## 2024-04-19 PROCEDURE — 76641 ULTRASOUND BREAST COMPLETE: CPT

## 2024-04-19 PROCEDURE — 77067 SCR MAMMO BI INCL CAD: CPT

## 2024-04-20 NOTE — PATIENT INSTRUCTIONS
Normal gynecological physical examination  Self-breast examination stressed  Mammogram ordered  Discussed regular exercise, healthy diet, importance of vitamin D and calcium supplements  Discussed importance of sun block use during periods of prolonged sun exposure  Patient will be seen in 1 year for routine gynecologic and medical examination  Patient will call office for any problems, concerns, or issues which may arise during the interim  No indicators present

## 2024-04-20 NOTE — PROGRESS NOTES
This patient was seen on 4/19/2024.    My role is Foot , Ankle, and Wound Specialist    ASSESSMENT     Diagnoses and all orders for this visit:    Pain of foot, unspecified laterality  -     X-ray foot left 3+ views; Future    Adductovarus rotation of toe, acquired, left    Callus of toe         Problem List Items Addressed This Visit    None  Visit Diagnoses       Pain of foot, unspecified laterality    -  Primary    Relevant Orders    X-ray foot left 3+ views    Adductovarus rotation of toe, acquired, left        Callus of toe              PLAN  -Alyssa and I discussed her left foot  -Provided patient with offloading padding to protect left fifth digit, also recommended over-the-counter toe slings to be purchased online for use in the left fifth digit  -We briefly touched on conservative versus surgical treatment modalities and I briefly explained the indications for surgery as well as the expected postoperative course for left fifth digit derotational arthroplasty  -We also discussed wider toe box sneakers.  -Return to clinic 6 weeks    X-ray from 4/17/2024 interpreted independently: Adductovarus malformation of the left fifth digit noted with frontal plane rotation of the digit.    SUBJECTIVE    Chief Complaint:  Left fifth digit pain     Patient ID: Vilma Brar     4/17/2024: Alyssa states that her right fifth digit is now essentially pain-free.  She does state however that her left fifth digit has been causing her pain.  She has a small callus to the area.  She denies injury to this digit.  She states that it hurts her most with activity and will bother her while she is golfing especially.  She has not attempted any treatment thus far.        The following portions of the patient's history were reviewed and updated as appropriate: allergies, current medications, past family history, past medical history, past social history, past surgical history and problem list.    Review of Systems   Constitutional:  "Negative.    HENT: Negative.     Respiratory: Negative.     Cardiovascular: Negative.    Gastrointestinal: Negative.    Musculoskeletal:  Positive for arthralgias.   Skin: Negative.    Neurological: Negative.          OBJECTIVE      /74   Pulse 70   Ht 5' 5\" (1.651 m)   Wt 64.9 kg (143 lb)   BMI 23.80 kg/m²        Physical Exam  Constitutional:       Appearance: Normal appearance.   HENT:      Head: Normocephalic and atraumatic.   Eyes:      General:         Right eye: No discharge.         Left eye: No discharge.   Cardiovascular:      Rate and Rhythm: Normal rate and regular rhythm.      Pulses:           Dorsalis pedis pulses are 2+ on the right side and 2+ on the left side.        Posterior tibial pulses are 2+ on the right side and 2+ on the left side.   Pulmonary:      Effort: Pulmonary effort is normal.      Breath sounds: Normal breath sounds.   Skin:     General: Skin is warm.      Capillary Refill: Capillary refill takes less than 2 seconds.   Neurological:      Sensory: Sensation is intact. No sensory deficit.         Vascular:  -DP and PT pulses intact b/l  -Capillary refill time <2 sec b/l  -Digital hair growth: Present  -Skin temp: WNL    MSK:  -Pain on palpation left 5th digit laterally at the level of the PIPJ  -No gross deformities noted   -MMT is 5/5 to all muscle compartments of the lower extremity  -Ankle dorsiflexion >10 degrees with knee extended and knee flexed b/l    Derm:  -No lesions, abrasions, or open wounds noted  -No noted interdigital maceration, peeling, malodor  -Mild hyperkeratotic tissue formation noted to the lateral aspect of the left fifth digit at the location of the PIPJ        "

## 2024-04-29 ENCOUNTER — NURSE TRIAGE (OUTPATIENT)
Age: 74
End: 2024-04-29

## 2024-04-29 NOTE — TELEPHONE ENCOUNTER
"Reason for Disposition   Patient wants to be seen    Answer Assessment - Initial Assessment Questions  1. LOCATION and RADIATION: \"Where is the pain located?\"       Right hip  2. QUALITY: \"What does the pain feel like?\"  (e.g., sharp, dull, aching, burning)      Sharp   3. SEVERITY: \"How bad is the pain?\" \"What does it keep you from doing?\"   (Scale 1-10; or mild, moderate, severe)    -  MILD (1-3): doesn't interfere with normal activities     -  MODERATE (4-7): interferes with normal activities (e.g., work or school) or awakens from sleep, limping     -  SEVERE (8-10): excruciating pain, unable to do any normal activities, unable to walk      5-6 ,7-8 while ambulating   4. ONSET: \"When did the pain start?\" \"Does it come and go, or is it there all the time?\"      Five days ago ,pain some and goes,standing is constant pain   5. WORK OR EXERCISE: \"Has there been any recent work or exercise that involved this part of the body?\"       Played golf and then pains started   6. CAUSE: \"What do you think is causing the hip pain?\"       Unknown   7. AGGRAVATING FACTORS: \"What makes the hip pain worse?\" (e.g., walking, climbing stairs, running)     Ambulating   8. OTHER SYMPTOMS: \"Do you have any other symptoms?\" (e.g., back pain, pain shooting down leg,  fever, rash)      Patient denies all other symptoms    Protocols used: Hip Pain-ADULT-[place]    "

## 2024-04-30 ENCOUNTER — OFFICE VISIT (OUTPATIENT)
Dept: FAMILY MEDICINE CLINIC | Facility: CLINIC | Age: 74
End: 2024-04-30
Payer: COMMERCIAL

## 2024-04-30 ENCOUNTER — APPOINTMENT (OUTPATIENT)
Dept: RADIOLOGY | Facility: CLINIC | Age: 74
End: 2024-04-30
Payer: COMMERCIAL

## 2024-04-30 VITALS
RESPIRATION RATE: 16 BRPM | HEART RATE: 73 BPM | SYSTOLIC BLOOD PRESSURE: 128 MMHG | DIASTOLIC BLOOD PRESSURE: 78 MMHG | TEMPERATURE: 97.7 F

## 2024-04-30 DIAGNOSIS — M25.551 RIGHT HIP PAIN: Primary | ICD-10-CM

## 2024-04-30 DIAGNOSIS — M25.551 RIGHT HIP PAIN: ICD-10-CM

## 2024-04-30 PROCEDURE — 1160F RVW MEDS BY RX/DR IN RCRD: CPT | Performed by: FAMILY MEDICINE

## 2024-04-30 PROCEDURE — 99213 OFFICE O/P EST LOW 20 MIN: CPT | Performed by: FAMILY MEDICINE

## 2024-04-30 PROCEDURE — 1159F MED LIST DOCD IN RCRD: CPT | Performed by: FAMILY MEDICINE

## 2024-04-30 PROCEDURE — G2211 COMPLEX E/M VISIT ADD ON: HCPCS | Performed by: FAMILY MEDICINE

## 2024-04-30 PROCEDURE — 73502 X-RAY EXAM HIP UNI 2-3 VIEWS: CPT

## 2024-04-30 NOTE — PROGRESS NOTES
Name: Vilma Brar      : 1950      MRN: [id number]  Encounter Provider: Carolyn Mancera MD  Encounter Date: 2024   Encounter department: [facility]    Assessment & Plan     1. Right hip pain  -     XR hip/pelv 2-3 vws right if performed; Future; Expected date: 2024  -     Ambulatory Referral to Orthopedic Surgery; Future       Recommend rest, heat/ice to the area, stretches, voltaren gel, OTC ibuprofen.     Subjective      Hip Pain   There was no injury mechanism (started 1 week ago after playing golf.). The pain is present in the right hip. The quality of the pain is described as aching. The pain has been Fluctuating since onset. Pertinent negatives include no inability to bear weight, loss of motion, loss of sensation, muscle weakness, numbness or tingling. She reports no foreign bodies present. The symptoms are aggravated by movement and weight bearing. She has tried NSAIDs, rest and heat for the symptoms.       Review of Systems   Constitutional: Negative.    HENT: Negative.     Eyes: Negative.    Respiratory: Negative.     Cardiovascular: Negative.    Gastrointestinal: Negative.    Endocrine: Negative.    Genitourinary: Negative.    Musculoskeletal: Negative.         Right hip pain   Skin: Negative.    Allergic/Immunologic: Negative.    Neurological: Negative.  Negative for tingling and numbness.   Hematological: Negative.    Psychiatric/Behavioral: Negative.         Current Outpatient Medications on File Prior to Visit   Medication Sig   • Ascorbic Acid (Vitamin C) 125 MG CHEW Chew in the morning   • calcium carbonate (OS-RICHARD) 600 MG tablet Take by mouth 2 (two) times a day   • Denta 5000 Plus 1.1 % CREA in the morning   • magnesium 30 MG tablet Take 30 mg by mouth in the morning   • Multiple Vitamins-Minerals (multivitamin with minerals) tablet Take 1 tablet by mouth daily   • VITAMIN D PO Take by mouth in the morning       Objective     /78   Pulse 73   Temp 97.7  °F (36.5 °C) (Temporal)   Resp 16     Physical Exam  Constitutional:       General: She is not in acute distress.     Appearance: She is well-developed. She is not diaphoretic.   HENT:      Head: Normocephalic and atraumatic.   Eyes:      General: No scleral icterus.        Right eye: No discharge.         Left eye: No discharge.      Conjunctiva/sclera: Conjunctivae normal.   Pulmonary:      Effort: Pulmonary effort is normal.   Musculoskeletal:      Cervical back: Normal range of motion.      Right hip: No deformity, lacerations, tenderness, bony tenderness or crepitus. Normal range of motion. Normal strength.      Left hip: Normal.      Right upper leg: Normal.   Skin:     General: Skin is warm.   Neurological:      Mental Status: She is alert and oriented to person, place, and time.   Psychiatric:         Behavior: Behavior normal.         Thought Content: Thought content normal.         Judgment: Judgment normal.     Carolyn Mancera MD

## 2024-05-30 ENCOUNTER — OFFICE VISIT (OUTPATIENT)
Age: 74
End: 2024-05-30
Payer: COMMERCIAL

## 2024-05-30 VITALS
HEART RATE: 71 BPM | WEIGHT: 143 LBS | BODY MASS INDEX: 23.82 KG/M2 | DIASTOLIC BLOOD PRESSURE: 77 MMHG | HEIGHT: 65 IN | SYSTOLIC BLOOD PRESSURE: 131 MMHG

## 2024-05-30 DIAGNOSIS — M20.5X2 ADDUCTOVARUS ROTATION OF TOE, ACQUIRED, LEFT: Primary | ICD-10-CM

## 2024-05-30 DIAGNOSIS — L84 CALLUS OF TOE: ICD-10-CM

## 2024-05-30 PROCEDURE — 99212 OFFICE O/P EST SF 10 MIN: CPT | Performed by: STUDENT IN AN ORGANIZED HEALTH CARE EDUCATION/TRAINING PROGRAM

## 2024-06-01 NOTE — PROGRESS NOTES
"This patient was seen on 5/30/2024.    My role is Foot , Ankle, and Wound Specialist    ASSESSMENT     Diagnoses and all orders for this visit:    Adductovarus rotation of toe, acquired, left    Callus of toe           Problem List Items Addressed This Visit    None  Visit Diagnoses       Adductovarus rotation of toe, acquired, left    -  Primary    Callus of toe                PLAN  -Alyssa and I discussed her left foot  -Continue supportive sneakers with over-the-counter inserts  -We again briefly discussed surgical versus conservative treatment for the patient's bilateral feet.  -Return to clinic as needed for new or worsening podiatric concerns    SUBJECTIVE    Chief Complaint:  Here for follow-up     Patient ID: Vilma Brar     4/17/2024: Alyssa states that her right fifth digit is now essentially pain-free.  She does state however that her left fifth digit has been causing her pain.  She has a small callus to the area.  She denies injury to this digit.  She states that it hurts her most with activity and will bother her while she is golfing especially.  She has not attempted any treatment thus far.    5/30/2024: Alyssa is overall doing very well today.  She did purchase a new pair of sneakers which has made her bilateral feet feel much better.        The following portions of the patient's history were reviewed and updated as appropriate: allergies, current medications, past family history, past medical history, past social history, past surgical history and problem list.    Review of Systems   Constitutional: Negative.    HENT: Negative.     Respiratory: Negative.     Cardiovascular: Negative.    Gastrointestinal: Negative.    Musculoskeletal:  Positive for arthralgias.   Skin: Negative.    Neurological: Negative.          OBJECTIVE      /77   Pulse 71   Ht 5' 5\" (1.651 m)   Wt 64.9 kg (143 lb)   BMI 23.80 kg/m²        Physical Exam  Constitutional:       Appearance: Normal appearance.   HENT:      " Head: Normocephalic and atraumatic.   Eyes:      General:         Right eye: No discharge.         Left eye: No discharge.   Cardiovascular:      Rate and Rhythm: Normal rate and regular rhythm.      Pulses:           Dorsalis pedis pulses are 2+ on the right side and 2+ on the left side.        Posterior tibial pulses are 2+ on the right side and 2+ on the left side.   Pulmonary:      Effort: Pulmonary effort is normal.      Breath sounds: Normal breath sounds.   Skin:     General: Skin is warm.      Capillary Refill: Capillary refill takes less than 2 seconds.   Neurological:      Sensory: Sensation is intact. No sensory deficit.         Vascular:  -DP and PT pulses intact b/l  -Capillary refill time <2 sec b/l  -Digital hair growth: Present  -Skin temp: WNL    MSK:  -No pain on palpation today  -No gross deformities noted   -MMT is 5/5 to all muscle compartments of the lower extremity  -Ankle dorsiflexion >10 degrees with knee extended and knee flexed b/l    Derm:  -No lesions, abrasions, or open wounds noted  -No noted interdigital maceration, peeling, malodor  -Decreased hyperkeratotic tissue formation noted on today's examination.

## 2024-08-01 ENCOUNTER — TELEPHONE (OUTPATIENT)
Age: 74
End: 2024-08-01

## 2024-08-01 NOTE — TELEPHONE ENCOUNTER
Patient called wanting to see Dr. Lutz as she is losing hair at a rapid rate and is quite concerned.  I saw no availability any time soon. I asked could the patient be seen by another provider however she only wanted to see Dr. Lutz for this issue.  Please advise how to proceed.  Thank you.

## 2024-08-02 NOTE — TELEPHONE ENCOUNTER
The available slot is for next day.  It cannot be used until the day prior after 3 pm.    Please advise.

## 2024-08-05 ENCOUNTER — RA CDI HCC (OUTPATIENT)
Dept: OTHER | Facility: HOSPITAL | Age: 74
End: 2024-08-05

## 2024-08-06 ENCOUNTER — OFFICE VISIT (OUTPATIENT)
Dept: FAMILY MEDICINE CLINIC | Facility: CLINIC | Age: 74
End: 2024-08-06
Payer: COMMERCIAL

## 2024-08-06 VITALS
HEIGHT: 65 IN | TEMPERATURE: 96.7 F | RESPIRATION RATE: 18 BRPM | WEIGHT: 142.6 LBS | HEART RATE: 84 BPM | BODY MASS INDEX: 23.76 KG/M2 | DIASTOLIC BLOOD PRESSURE: 70 MMHG | SYSTOLIC BLOOD PRESSURE: 118 MMHG

## 2024-08-06 DIAGNOSIS — L65.9 HAIR LOSS: Primary | ICD-10-CM

## 2024-08-06 DIAGNOSIS — M81.0 OSTEOPOROSIS, UNSPECIFIED OSTEOPOROSIS TYPE, UNSPECIFIED PATHOLOGICAL FRACTURE PRESENCE: ICD-10-CM

## 2024-08-06 DIAGNOSIS — E78.2 MIXED HYPERLIPIDEMIA: ICD-10-CM

## 2024-08-06 PROCEDURE — 99214 OFFICE O/P EST MOD 30 MIN: CPT | Performed by: FAMILY MEDICINE

## 2024-08-06 PROCEDURE — G2211 COMPLEX E/M VISIT ADD ON: HCPCS | Performed by: FAMILY MEDICINE

## 2024-08-06 RX ORDER — CHLORHEXIDINE GLUCONATE ORAL RINSE 1.2 MG/ML
SOLUTION DENTAL
COMMUNITY
Start: 2024-07-05

## 2024-08-06 RX ORDER — CIPROFLOXACIN 500 MG/1
TABLET, FILM COATED ORAL
COMMUNITY
Start: 2024-07-05

## 2024-08-06 NOTE — ASSESSMENT & PLAN NOTE
Having bone loss in jaw and having dental issues   Has been taking vitamin D supplement on days she isn't outside   Will check vitamin D level

## 2024-08-06 NOTE — PROGRESS NOTES
"Ambulatory Visit  Name: Vilma Brar      : 1950      MRN: [id number]  Encounter Provider: Jeni Lutz DO  Encounter Date: 2024   Encounter department: [facility]    Assessment & Plan   1. Hair loss  Comments:  Worsening  Labs ordered  We also discussed it may be secondary to having COVID  Orders:  -     CBC; Future  -     TSH, 3rd generation; Future  -     Ferritin; Future  2. Mixed hyperlipidemia  Assessment & Plan:  Has been controlled with diet  Will check labs  Orders:  -     Comprehensive metabolic panel; Future  -     Lipid Panel with Direct LDL reflex; Future  3. Osteoporosis, unspecified osteoporosis type, unspecified pathological fracture presence  Assessment & Plan:  Having bone loss in jaw and having dental issues   Has been taking vitamin D supplement on days she isn't outside   Will check vitamin D level  Orders:  -     Vitamin D 25 hydroxy; Future  Advised to stop biotin for 5 days before lab work       History of Present Illness     She has been having a rough year.  She had a fall earlier in the year.  Now she is working with her dentist and is having significant issues with them and may need an oral surgeon as well.  They also think she may have lost enough bone in her jaw to make it difficult to do implants.  She is feeling very frustrated by her ongoing dental concerns.    She also had COVID earlier this year        Review of Systems    Objective     /70   Pulse 84   Temp (!) 96.7 °F (35.9 °C)   Resp 18   Ht 5' 5\" (1.651 m)   Wt 64.7 kg (142 lb 9.6 oz)   BMI 23.73 kg/m²     Physical Exam  Vitals and nursing note reviewed.   Constitutional:       General: She is not in acute distress.     Appearance: She is well-developed.   HENT:      Head: Normocephalic and atraumatic.      Right Ear: Tympanic membrane normal.      Left Ear: Tympanic membrane normal.   Cardiovascular:      Rate and Rhythm: Normal rate and regular rhythm.      Heart sounds: No murmur " heard.  Pulmonary:      Effort: Pulmonary effort is normal. No respiratory distress.      Breath sounds: Normal breath sounds.   Musculoskeletal:      Cervical back: Neck supple.   Neurological:      Mental Status: She is alert.     Male pattern hair loss  Administrative Statements

## 2024-08-07 ENCOUNTER — OFFICE VISIT (OUTPATIENT)
Dept: GASTROENTEROLOGY | Facility: AMBULARY SURGERY CENTER | Age: 74
End: 2024-08-07
Payer: COMMERCIAL

## 2024-08-07 VITALS
WEIGHT: 143 LBS | BODY MASS INDEX: 23.82 KG/M2 | HEIGHT: 65 IN | DIASTOLIC BLOOD PRESSURE: 76 MMHG | HEART RATE: 83 BPM | SYSTOLIC BLOOD PRESSURE: 124 MMHG | OXYGEN SATURATION: 98 %

## 2024-08-07 DIAGNOSIS — Z12.11 SCREENING FOR COLON CANCER: ICD-10-CM

## 2024-08-07 DIAGNOSIS — K76.9 LIVER LESION: Primary | ICD-10-CM

## 2024-08-07 PROCEDURE — 99204 OFFICE O/P NEW MOD 45 MIN: CPT | Performed by: INTERNAL MEDICINE

## 2024-08-07 NOTE — PROGRESS NOTES
[facility] - Outpatient Note  Vilma Brar 73 y.o. female MRN: [id number]  Unit/Bed#:  Encounter: [id number]          ASSESSMENT AND PLAN:              There are no diagnoses linked to this encounter.      ______________________________________________________________________    HPI:  74 yo female with PMHx       REVIEW OF SYSTEMS:    CONSTITUTIONAL: Denies any fever, chills, rigors, and weight loss.  HEENT: No earache or tinnitus. Denies hearing loss or visual disturbances.  CARDIOVASCULAR: No chest pain or palpitations.   RESPIRATORY: Denies any cough, hemoptysis, shortness of breath or dyspnea on exertion.  GASTROINTESTINAL: As noted in the History of Present Illness.   GENITOURINARY: No problems with urination. Denies any hematuria or dysuria.  NEUROLOGIC: No dizziness or vertigo, denies headaches.   MUSCULOSKELETAL: Denies any muscle or joint pain.   SKIN: Denies skin rashes or itching.   ENDOCRINE: Denies excessive thirst. Denies intolerance to heat or cold.  PSYCHOSOCIAL: Denies depression or anxiety. Denies any recent memory loss.       Historical Information   Past Medical History:   Diagnosis Date    Diverticulitis of colon      Past Surgical History:   Procedure Laterality Date    KNEE SURGERY      TONSILECTOMY AND ADNOIDECTOMY       Social History   Social History     Substance and Sexual Activity   Alcohol Use Yes    Alcohol/week: 3.0 standard drinks of alcohol    Types: 3 Glasses of wine per week    Comment: social     Social History     Substance and Sexual Activity   Drug Use No     Social History     Tobacco Use   Smoking Status Former    Current packs/day: 0.00    Average packs/day: 0.5 packs/day for 25.0 years (12.5 ttl pk-yrs)    Types: Cigarettes    Start date:     Quit date: 1980    Years since quittin.6    Passive exposure: Past   Smokeless Tobacco Never   Tobacco Comments    Started at age 17   sas/cma     Family History   Problem Relation Age of Onset    Bone  "cancer Mother 57    Stomach cancer Mother     Coronary artery disease Father     No Known Problems Sister     Stomach cancer Maternal Grandmother     No Known Problems Maternal Grandfather     No Known Problems Paternal Grandmother     No Known Problems Paternal Grandfather     Cancer Maternal Aunt     No Known Problems Maternal Aunt     No Known Problems Maternal Aunt     No Known Problems Maternal Aunt     No Known Problems Paternal Aunt     No Known Problems Paternal Aunt     No Known Problems Paternal Aunt        Meds/Allergies       Current Outpatient Medications:     Ascorbic Acid (Vitamin C) 125 MG CHEW    calcium carbonate (OS-RICHARD) 600 MG tablet    chlorhexidine (PERIDEX) 0.12 % solution    ciprofloxacin (CIPRO) 500 mg tablet    Denta 5000 Plus 1.1 % CREA    magnesium 30 MG tablet    Multiple Vitamins-Minerals (multivitamin with minerals) tablet    VITAMIN D PO    Allergies   Allergen Reactions    Camphor Hives    Epinephrine Palpitations    Blistex Medicated [Cold Sore Products]     Penicillins Hives     Reaction Date: 03Mar2005;     Shellfish Allergy - Food Allergy      Reaction Date: 03Mar2005;     Sulfa Antibiotics Hives     As a child     Tetracyclines & Related Hives     Reaction Date: 03Mar2005;     Vicks Babyrub  [Liniments]     Latex Rash     Reaction Date: 31Aug2010;            Objective     Blood pressure 124/76, pulse 83, height 5' 5\" (1.651 m), weight 64.9 kg (143 lb), SpO2 98%.        PHYSICAL EXAM:      General Appearance:   Alert, cooperative, no distress   HEENT:   Normocephalic, atraumatic, anicteric.     Neck:  Supple, symmetrical, trachea midline   Lungs:   Clear to auscultation bilaterally; no rales, rhonchi or wheezing; respirations unlabored    Heart::   Regular rate and rhythm; no murmur, rub, or gallop.   Abdomen:   Soft, non-tender, non-distended; normal bowel sounds; no masses, no organomegaly    Genitalia:   Deferred    Rectal:   Deferred    Extremities:  No cyanosis, clubbing or " edema    Pulses:  2+ and symmetric all extremities    Skin:  No jaundice, rashes, or lesions    Lymph nodes:  No palpable cervical lymphadenopathy        Lab Results:   No visits with results within 1 Day(s) from this visit.   Latest known visit with results is:   Appointment on 10/30/2023   Component Date Value    WBC 10/30/2023 4.20 (L)     RBC 10/30/2023 4.01     Hemoglobin 10/30/2023 12.4     Hematocrit 10/30/2023 40.2     MCV 10/30/2023 100 (H)     MCH 10/30/2023 30.9     MCHC 10/30/2023 30.8 (L)     RDW 10/30/2023 13.5     Platelets 10/30/2023 206     MPV 10/30/2023 11.5     Sodium 10/30/2023 142     Potassium 10/30/2023 4.1     Chloride 10/30/2023 106     CO2 10/30/2023 31     ANION GAP 10/30/2023 5     BUN 10/30/2023 16     Creatinine 10/30/2023 0.69     Glucose, Fasting 10/30/2023 92     Calcium 10/30/2023 9.6     AST 10/30/2023 24     ALT 10/30/2023 16     Alkaline Phosphatase 10/30/2023 76     Total Protein 10/30/2023 6.7     Albumin 10/30/2023 4.0     Total Bilirubin 10/30/2023 0.36     eGFR 10/30/2023 86     Cholesterol 10/30/2023 230 (H)     Triglycerides 10/30/2023 64     HDL, Direct 10/30/2023 85     LDL Calculated 10/30/2023 132 (H)     TSH 3RD GENERATON 10/30/2023 2.718     Ferritin 10/30/2023 89

## 2024-08-07 NOTE — PROGRESS NOTES
Consultation -  [facility]  Vilma Brar 73 y.o. female MRN: [id number]  Unit/Bed#:  Encounter: [id number]        Consults    ASSESSMENT/PLAN:     1.  Colon cancer screening: Average risk, no previous colonoscopy, no family history of colon cancer.  -Recommend screening colonoscopy at this time.  -Recommend MiraLAX/Dulcolax bowel prep.  -Patient was explained about the risks and benefits of the procedure. Risks including but not limited to bleeding, infection, perforation were explained in detail. Also explained about less than 100% sensitivity with the exam and other alternatives.  -Discussed with patient that she would be undergoing twilight anesthesia.  -Patient reports that she has dental procedures coming up therefore we will call us to schedule a colonoscopy at a later time.    2.  Liver lesions noted on MRI in 2015: Thought to be hemangiomas however one of the liver lesions was difficult to characterize and was recommended an MRI at 6-month interval.  This has not been done.  Would recommend repeating MRI with liver protocol.        ______________________________________________________________________    Reason for Consult / Principal Problem: [unfilled]    HPI: Vilma Brar is a 73 y.o. year old female with history of hyperlipidemia, gastroparesis, presents for colon cancer screening evaluation.  Patient reports that she has never had a colonoscopy.  Reports having had an episode of diverticulitis in 2015 which responded to Cipro and Flagyl.  She reports that she was noted to have a liver lesions on the CAT scan when she was diagnosed with diverticulitis and was subsequently recommended an MRI.  She underwent an MRI in 2015 which was notable for 3 hepatic lesions with 2 of the lesions thought to be hemangiomas however the third was difficult to characterize and patient was ultimately recommended an MRI at 6-month interval.  Patient does not recall having had a repeat MRI.   She is otherwise asymptomatic.  Interactive her GI symptoms, she denies any change in bowel habits, diarrhea, rectal bleeding, unintentional weight loss or abdominal pain.  Patient does report having had a bout of gastroenteritis few months ago however this has since resolved.  She denies any upper GI symptoms including acid reflux, dysphagia, loss of appetite or early satiety.  Patient is very concerned about undergoing anesthesia as she has had bad experience many years ago with what sounds like general anesthesia.  She does not have any recent blood work however  Has an order for CBC, CMP by her PCP.  Her previous CBC showed normal hemoglobin, liver enzymes were normal.  Thyroid function was normal.        Review of Systems:  The remainder of the review of systems was negative except for the pertinent positives noted in HPI.     Historical Information   Past Medical History:   Diagnosis Date    Diverticulitis of colon      Past Surgical History:   Procedure Laterality Date    KNEE SURGERY      TONSILECTOMY AND ADNOIDECTOMY       Social History   Social History     Substance and Sexual Activity   Alcohol Use Yes    Alcohol/week: 3.0 standard drinks of alcohol    Types: 3 Glasses of wine per week    Comment: social     Social History     Substance and Sexual Activity   Drug Use No     Social History     Tobacco Use   Smoking Status Former    Current packs/day: 0.00    Average packs/day: 0.5 packs/day for 25.0 years (12.5 ttl pk-yrs)    Types: Cigarettes    Start date:     Quit date:     Years since quittin.6    Passive exposure: Past   Smokeless Tobacco Never   Tobacco Comments    Started at age 17   sas/cma     Family History   Problem Relation Age of Onset    Bone cancer Mother 57    Stomach cancer Mother     Coronary artery disease Father     No Known Problems Sister     Stomach cancer Maternal Grandmother     No Known Problems Maternal Grandfather     No Known Problems Paternal Grandmother     No  "Known Problems Paternal Grandfather     Cancer Maternal Aunt     No Known Problems Maternal Aunt     No Known Problems Maternal Aunt     No Known Problems Maternal Aunt     No Known Problems Paternal Aunt     No Known Problems Paternal Aunt     No Known Problems Paternal Aunt        Meds/Allergies     Not in a hospital admission.  No current facility-administered medications for this visit.       Allergies   Allergen Reactions    Camphor Hives    Epinephrine Palpitations    Blistex Medicated [Cold Sore Products]     Penicillins Hives     Reaction Date: 03Mar2005;     Shellfish Allergy - Food Allergy      Reaction Date: 03Mar2005;     Sulfa Antibiotics Hives     As a child     Tetracyclines & Related Hives     Reaction Date: 03Mar2005;     Vicks Babyrub  [Liniments]     Latex Rash     Reaction Date: 31Aug2010;        Objective     Blood pressure 124/76, pulse 83, height 5' 5\" (1.651 m), weight 64.9 kg (143 lb), SpO2 98%.    [unfilled]    PHYSICAL EXAM     GEN: well nourished, well developed, no acute distress  HEENT: anicteric, MMM, no cervical or supraclavicular lymphadenopathy  CV: RRR, no m/r/g  CHEST: CTA b/l, no WRR  ABD: +BS, soft, NT/ND, no hepatosplenomegaly  EXT: no c/c/e  SKIN: no rashes,  NEURO: aaox3    Lab Results:   No visits with results within 1 Day(s) from this visit.   Latest known visit with results is:   Appointment on 10/30/2023   Component Date Value    WBC 10/30/2023 4.20 (L)     RBC 10/30/2023 4.01     Hemoglobin 10/30/2023 12.4     Hematocrit 10/30/2023 40.2     MCV 10/30/2023 100 (H)     MCH 10/30/2023 30.9     MCHC 10/30/2023 30.8 (L)     RDW 10/30/2023 13.5     Platelets 10/30/2023 206     MPV 10/30/2023 11.5     Sodium 10/30/2023 142     Potassium 10/30/2023 4.1     Chloride 10/30/2023 106     CO2 10/30/2023 31     ANION GAP 10/30/2023 5     BUN 10/30/2023 16     Creatinine 10/30/2023 0.69     Glucose, Fasting 10/30/2023 92     Calcium 10/30/2023 9.6     AST 10/30/2023 24     ALT " 10/30/2023 16     Alkaline Phosphatase 10/30/2023 76     Total Protein 10/30/2023 6.7     Albumin 10/30/2023 4.0     Total Bilirubin 10/30/2023 0.36     eGFR 10/30/2023 86     Cholesterol 10/30/2023 230 (H)     Triglycerides 10/30/2023 64     HDL, Direct 10/30/2023 85     LDL Calculated 10/30/2023 132 (H)     TSH 3RD GENERATON 10/30/2023 2.718     Ferritin 10/30/2023 89      Imaging Studies: I have personally reviewed pertinent films in PACS

## 2024-08-13 ENCOUNTER — APPOINTMENT (OUTPATIENT)
Dept: LAB | Facility: CLINIC | Age: 74
End: 2024-08-13
Payer: COMMERCIAL

## 2024-08-13 DIAGNOSIS — M81.0 OSTEOPOROSIS, UNSPECIFIED OSTEOPOROSIS TYPE, UNSPECIFIED PATHOLOGICAL FRACTURE PRESENCE: ICD-10-CM

## 2024-08-13 DIAGNOSIS — L65.9 HAIR LOSS: ICD-10-CM

## 2024-08-13 DIAGNOSIS — E78.2 MIXED HYPERLIPIDEMIA: ICD-10-CM

## 2024-08-13 LAB
25(OH)D3 SERPL-MCNC: 55.5 NG/ML (ref 30–100)
ALBUMIN SERPL BCG-MCNC: 4.1 G/DL (ref 3.5–5)
ALP SERPL-CCNC: 78 U/L (ref 34–104)
ALT SERPL W P-5'-P-CCNC: 14 U/L (ref 7–52)
ANION GAP SERPL CALCULATED.3IONS-SCNC: 8 MMOL/L (ref 4–13)
AST SERPL W P-5'-P-CCNC: 21 U/L (ref 13–39)
BILIRUB SERPL-MCNC: 0.51 MG/DL (ref 0.2–1)
BUN SERPL-MCNC: 18 MG/DL (ref 5–25)
CALCIUM SERPL-MCNC: 9.2 MG/DL (ref 8.4–10.2)
CHLORIDE SERPL-SCNC: 102 MMOL/L (ref 96–108)
CHOLEST SERPL-MCNC: 256 MG/DL
CO2 SERPL-SCNC: 29 MMOL/L (ref 21–32)
CREAT SERPL-MCNC: 0.65 MG/DL (ref 0.6–1.3)
ERYTHROCYTE [DISTWIDTH] IN BLOOD BY AUTOMATED COUNT: 12.9 % (ref 11.6–15.1)
FERRITIN SERPL-MCNC: 102 NG/ML (ref 11–307)
GFR SERPL CREATININE-BSD FRML MDRD: 87 ML/MIN/1.73SQ M
GLUCOSE P FAST SERPL-MCNC: 88 MG/DL (ref 65–99)
HCT VFR BLD AUTO: 40.4 % (ref 34.8–46.1)
HDLC SERPL-MCNC: 86 MG/DL
HGB BLD-MCNC: 12.8 G/DL (ref 11.5–15.4)
LDLC SERPL CALC-MCNC: 152 MG/DL (ref 0–100)
MCH RBC QN AUTO: 31.4 PG (ref 26.8–34.3)
MCHC RBC AUTO-ENTMCNC: 31.7 G/DL (ref 31.4–37.4)
MCV RBC AUTO: 99 FL (ref 82–98)
PLATELET # BLD AUTO: 227 THOUSANDS/UL (ref 149–390)
PMV BLD AUTO: 11.3 FL (ref 8.9–12.7)
POTASSIUM SERPL-SCNC: 4.2 MMOL/L (ref 3.5–5.3)
PROT SERPL-MCNC: 6.6 G/DL (ref 6.4–8.4)
RBC # BLD AUTO: 4.07 MILLION/UL (ref 3.81–5.12)
SODIUM SERPL-SCNC: 139 MMOL/L (ref 135–147)
TRIGL SERPL-MCNC: 90 MG/DL
TSH SERPL DL<=0.05 MIU/L-ACNC: 1.19 UIU/ML (ref 0.45–4.5)
WBC # BLD AUTO: 4.49 THOUSAND/UL (ref 4.31–10.16)

## 2024-08-13 PROCEDURE — 80061 LIPID PANEL: CPT

## 2024-08-13 PROCEDURE — 85027 COMPLETE CBC AUTOMATED: CPT

## 2024-08-13 PROCEDURE — 84443 ASSAY THYROID STIM HORMONE: CPT

## 2024-08-13 PROCEDURE — 80053 COMPREHEN METABOLIC PANEL: CPT

## 2024-08-13 PROCEDURE — 82306 VITAMIN D 25 HYDROXY: CPT

## 2024-08-13 PROCEDURE — 36415 COLL VENOUS BLD VENIPUNCTURE: CPT

## 2024-08-13 PROCEDURE — 82728 ASSAY OF FERRITIN: CPT

## 2024-09-06 PROBLEM — Z12.11 SCREENING FOR COLON CANCER: Status: RESOLVED | Noted: 2024-08-07 | Resolved: 2024-09-06

## 2024-09-26 ENCOUNTER — APPOINTMENT (OUTPATIENT)
Dept: RADIOLOGY | Facility: CLINIC | Age: 74
End: 2024-09-26
Payer: COMMERCIAL

## 2024-09-26 ENCOUNTER — OFFICE VISIT (OUTPATIENT)
Dept: FAMILY MEDICINE CLINIC | Facility: CLINIC | Age: 74
End: 2024-09-26
Payer: COMMERCIAL

## 2024-09-26 VITALS
BODY MASS INDEX: 23.8 KG/M2 | HEART RATE: 72 BPM | RESPIRATION RATE: 18 BRPM | SYSTOLIC BLOOD PRESSURE: 120 MMHG | HEIGHT: 65 IN | DIASTOLIC BLOOD PRESSURE: 68 MMHG | TEMPERATURE: 97.4 F

## 2024-09-26 DIAGNOSIS — R07.81 RIB PAIN ON LEFT SIDE: ICD-10-CM

## 2024-09-26 DIAGNOSIS — R07.81 RIB PAIN ON LEFT SIDE: Primary | ICD-10-CM

## 2024-09-26 PROCEDURE — G2211 COMPLEX E/M VISIT ADD ON: HCPCS | Performed by: NURSE PRACTITIONER

## 2024-09-26 PROCEDURE — 99213 OFFICE O/P EST LOW 20 MIN: CPT | Performed by: NURSE PRACTITIONER

## 2024-09-26 PROCEDURE — 71101 X-RAY EXAM UNILAT RIBS/CHEST: CPT

## 2024-09-26 NOTE — PROGRESS NOTES
"Assessment/Plan:    1. Rib pain on left side  Comments:  Advised to support area for cough and sneez and will avoid heavy lifting and playing golf at this time  Orders:  -     XR ribs left w pa chest min 3 views; Future; Expected date: 09/26/2024        Patient Instructions:  Supportive care discussed and advised.  Advised to RTO for any worsening and no improvement.   Follow up for no improvement and worsening of conditions.  Patient advised and educated when to see immediate medical care.    Return if symptoms worsen or fail to improve.      Future Appointments   Date Time Provider Department Center   11/8/2024 10:30 AM DO RALPH Alberto [facility]           Subjective:      Patient ID: Vilma Brar is a 73 y.o. female.    Chief Complaint   Patient presents with    Rib Injury     Pain in left ribcage under breast started 3-4 weeks ago after weight lifting.  Jolene Lucas MA         Vitals:  /68   Pulse 72   Temp (!) 97.4 °F (36.3 °C)   Resp 18   Ht 5' 5\" (1.651 m)   BMI 23.80 kg/m²     HPI  Patient stated that about 3 to 4 weeks ago was lifting weights and thinks that pulled rib in left side and stated that having lot of pain in the left mid rib cage area and denies fever, chills and sob. Does play golf too          The following portions of the patient's history were reviewed and updated as appropriate: allergies, current medications, past family history, past medical history, past social history, past surgical history and problem list.      Review of Systems   Constitutional:  Negative for chills, diaphoresis, fatigue, fever and unexpected weight change.   HENT:  Negative for congestion, dental problem, drooling, ear discharge, ear pain, facial swelling, hearing loss, mouth sores, nosebleeds, postnasal drip, rhinorrhea, sinus pressure, sinus pain, sneezing, sore throat, tinnitus, trouble swallowing and voice change.    Respiratory:  Negative for cough, chest tightness, shortness of breath " and wheezing.         As noted in HPI       Cardiovascular: Negative.    Gastrointestinal:  Negative for abdominal pain, constipation, diarrhea, nausea and vomiting.   Skin: Negative.    Neurological:  Negative for dizziness, light-headedness and headaches.         Objective:    Social History     Tobacco Use   Smoking Status Former    Current packs/day: 0.00    Average packs/day: 0.5 packs/day for 25.0 years (12.5 ttl pk-yrs)    Types: Cigarettes    Start date:     Quit date:     Years since quittin.7    Passive exposure: Past   Smokeless Tobacco Never   Tobacco Comments    Started at age 17   sas/cma       Allergies:   Allergies   Allergen Reactions    Camphor Hives    Epinephrine Palpitations    Blistex Medicated [Cold Sore Products]     Penicillins Hives     Reaction Date: 2005;     Shellfish Allergy - Food Allergy      Reaction Date: 2005;     Sulfa Antibiotics Hives     As a child     Tetracyclines & Related Hives     Reaction Date: 2005;     Vicks Babyrub  [Liniments]     Latex Rash     Reaction Date: 2010;          Current Outpatient Medications   Medication Sig Dispense Refill    Ascorbic Acid (Vitamin C) 125 MG CHEW Chew in the morning      calcium carbonate (OS-RICHARD) 600 MG tablet Take by mouth 2 (two) times a day      Denta 5000 Plus 1.1 % CREA in the morning      magnesium 30 MG tablet Take 30 mg by mouth in the morning      Multiple Vitamins-Minerals (multivitamin with minerals) tablet Take 1 tablet by mouth daily      VITAMIN D PO Take by mouth in the morning      chlorhexidine (PERIDEX) 0.12 % solution Has not started yet (Patient not taking: Reported on 2024)      ciprofloxacin (CIPRO) 500 mg tablet Has not started yet (Patient not taking: Reported on 2024)       No current facility-administered medications for this visit.          Physical Exam  Constitutional:       Appearance: Normal appearance.   HENT:      Head: Normocephalic and atraumatic.      Nose:  Nose normal.   Eyes:      Conjunctiva/sclera: Conjunctivae normal.   Cardiovascular:      Rate and Rhythm: Normal rate and regular rhythm.      Pulses: Normal pulses.      Heart sounds: Normal heart sounds.   Pulmonary:      Effort: Pulmonary effort is normal.      Breath sounds: Normal breath sounds.   Chest:       Skin:     General: Skin is warm and dry.      Findings: No rash.   Neurological:      Mental Status: She is alert and oriented to person, place, and time.   Psychiatric:         Mood and Affect: Mood normal.         Behavior: Behavior normal.         Thought Content: Thought content normal.         Judgment: Judgment normal.                     TIM Carroll

## 2024-11-01 ENCOUNTER — RA CDI HCC (OUTPATIENT)
Dept: OTHER | Facility: HOSPITAL | Age: 74
End: 2024-11-01

## 2024-11-08 ENCOUNTER — OFFICE VISIT (OUTPATIENT)
Dept: FAMILY MEDICINE CLINIC | Facility: CLINIC | Age: 74
End: 2024-11-08
Payer: COMMERCIAL

## 2024-11-08 VITALS
HEIGHT: 65 IN | DIASTOLIC BLOOD PRESSURE: 80 MMHG | TEMPERATURE: 97.6 F | BODY MASS INDEX: 24.16 KG/M2 | OXYGEN SATURATION: 98 % | HEART RATE: 80 BPM | WEIGHT: 145 LBS | SYSTOLIC BLOOD PRESSURE: 134 MMHG | RESPIRATION RATE: 18 BRPM

## 2024-11-08 DIAGNOSIS — R92.30 DENSE BREAST: ICD-10-CM

## 2024-11-08 DIAGNOSIS — Z13.6 SCREENING FOR CARDIOVASCULAR CONDITION: ICD-10-CM

## 2024-11-08 DIAGNOSIS — M94.0 COSTOCHONDRITIS: ICD-10-CM

## 2024-11-08 DIAGNOSIS — Z12.31 SCREENING MAMMOGRAM, ENCOUNTER FOR: ICD-10-CM

## 2024-11-08 DIAGNOSIS — Z00.00 MEDICARE ANNUAL WELLNESS VISIT, SUBSEQUENT: Primary | ICD-10-CM

## 2024-11-08 PROCEDURE — G0439 PPPS, SUBSEQ VISIT: HCPCS | Performed by: FAMILY MEDICINE

## 2024-11-08 NOTE — PROGRESS NOTES
Ambulatory Visit  Name: Vilma Brar      : 1950      MRN: [id number]  Encounter Provider: Jeni Lutz DO  Encounter Date: 2024   Encounter department: [facility]    Assessment & Plan  Medicare annual wellness visit, subsequent         Screening for cardiovascular condition    Orders:    CT coronary calcium score; Future    Screening mammogram, encounter for    Orders:    US breast screening bilateral complete (ABUS); Future    Mammo screening bilateral w 3d and cad; Future    Dense breast    Orders:    US breast screening bilateral complete (ABUS); Future    Costochondritis  Improved  Recommend that she follow-up with a chiropractor if it flares again          Preventive health issues were discussed with patient, and age appropriate screening tests were ordered as noted in patient's After Visit Summary. Personalized health advice and appropriate referrals for health education or preventive services given if needed, as noted in patient's After Visit Summary.    History of Present Illness     Patient did have some rib pain a couple weeks ago.  The pain has since improved.  It started after she was doing more weightlifting       Patient Care Team:  Jeni Lutz DO as PCP - General (Family Medicine)  MD Sung Mcdaniel MD Mitchell Cooper, MD    Review of Systems  Medical History Reviewed by provider this encounter:  Tobacco  Allergies  Meds  Problems  Med Hx  Surg Hx  Fam Hx       Annual Wellness Visit Questionnaire   Vilma is here for her Subsequent Wellness visit.     Health Risk Assessment:   Patient rates overall health as good. Patient feels that their physical health rating is same. Patient is satisfied with their life. Eyesight was rated as same. Hearing was rated as same. Patient feels that their emotional and mental health rating is same. Patients states they are never, rarely angry. Patient states they are never, rarely unusually  tired/fatigued. Pain experienced in the last 7 days has been some. Patient's pain rating has been 3/10. Patient states that she has experienced no weight loss or gain in last 6 months.     Depression Screening:   PHQ-2 Score: 0      Fall Risk Screening:   In the past year, patient has experienced: history of falling in past year    Number of falls: 1  Injured during fall?: Yes    Feels unsteady when standing or walking?: No    Worried about falling?: No      Urinary Incontinence Screening:   Patient has not leaked urine accidently in the last six months.     Home Safety:  Patient does not have trouble with stairs inside or outside of their home. Patient has working smoke alarms and has working carbon monoxide detector. Home safety hazards include: none.     Nutrition:   Current diet is Regular.     Medications:   Patient is not currently taking any over-the-counter supplements. Patient is able to manage medications.     Activities of Daily Living (ADLs)/Instrumental Activities of Daily Living (IADLs):   Walk and transfer into and out of bed and chair?: Yes  Dress and groom yourself?: Yes    Bathe or shower yourself?: Yes    Feed yourself? Yes  Do your laundry/housekeeping?: Yes  Manage your money, pay your bills and track your expenses?: Yes  Make your own meals?: Yes    Do your own shopping?: Yes    Previous Hospitalizations:   Any hospitalizations or ED visits within the last 12 months?: Yes    How many hospitalizations have you had in the last year?: 1-2    Advance Care Planning:   Living will: No    Durable POA for healthcare: No    Advanced directive: No    Advanced directive counseling given: Yes    ACP document given: Yes    End of Life Decisions reviewed with patient: Yes    Provider agrees with end of life decisions: Yes      Cognitive Screening:   Provider or family/friend/caregiver concerned regarding cognition?: No    PREVENTIVE SCREENINGS      Cardiovascular Screening:    General: Screening Not  Indicated and History Lipid Disorder      Diabetes Screening:     General: Screening Current      Colorectal Cancer Screening:     General: Screening Not Indicated      Breast Cancer Screening:     General: Screening Current      Cervical Cancer Screening:    General: Screening Not Indicated      Osteoporosis Screening:    General: Screening Not Indicated and History Osteoporosis      Abdominal Aortic Aneurysm (AAA) Screening:        General: Screening Not Indicated      Lung Cancer Screening:     General: Screening Not Indicated      Hepatitis C Screening:    General: Screening Current    Screening, Brief Intervention, and Referral to Treatment (SBIRT)    Screening  Typical number of drinks in a day: 0  Typical number of drinks in a week: 4  Interpretation: Low risk drinking behavior.    AUDIT-C Screenin) How often did you have a drink containing alcohol in the past year? never  2) How many drinks did you have on a typical day when you were drinking in the past year? 0  3) How often did you have 6 or more drinks on one occasion in the past year? never    AUDIT-C Score: 0  Interpretation: Score 0-2 (female): Negative screen for alcohol misuse    Single Item Drug Screening:  How often have you used an illegal drug (including marijuana) or a prescription medication for non-medical reasons in the past year? never    Single Item Drug Screen Score: 0  Interpretation: Negative screen for possible drug use disorder    Brief Intervention  Alcohol & drug use screenings were reviewed. No concerns regarding substance use disorder identified.     Social Determinants of Health     Financial Resource Strain: Low Risk  (10/31/2023)    Overall Financial Resource Strain (CARDIA)     Difficulty of Paying Living Expenses: Not hard at all   Food Insecurity: No Food Insecurity (2024)    Hunger Vital Sign     Worried About Running Out of Food in the Last Year: Never true     Ran Out of Food in the Last Year: Never true  "  Transportation Needs: No Transportation Needs (11/8/2024)    PRAPARE - Transportation     Lack of Transportation (Medical): No     Lack of Transportation (Non-Medical): No   Housing Stability: Unknown (11/8/2024)    Housing Stability Vital Sign     Unable to Pay for Housing in the Last Year: No     Homeless in the Last Year: No   Utilities: Not At Risk (11/8/2024)    [facility] Utilities     Threatened with loss of utilities: No     No results found.    Objective     /80   Pulse 80   Temp 97.6 °F (36.4 °C)   Resp 18   Ht 5' 5\" (1.651 m)   Wt 65.8 kg (145 lb)   SpO2 98%   BMI 24.13 kg/m²     Physical Exam  Vitals and nursing note reviewed.   Constitutional:       Appearance: She is well-developed.   HENT:      Head: Normocephalic and atraumatic.      Right Ear: External ear normal.      Left Ear: External ear normal.      Nose: Nose normal.   Cardiovascular:      Rate and Rhythm: Normal rate and regular rhythm.      Heart sounds: Normal heart sounds. No murmur heard.     No friction rub.   Pulmonary:      Effort: No respiratory distress.      Breath sounds: Normal breath sounds. No wheezing or rales.         "

## 2024-11-08 NOTE — PATIENT INSTRUCTIONS
Medicare Preventive Visit Patient Instructions  Thank you for completing your Welcome to Medicare Visit or Medicare Annual Wellness Visit today. Your next wellness visit will be due in one year (11/9/2025).  The screening/preventive services that you may require over the next 5-10 years are detailed below. Some tests may not apply to you based off risk factors and/or age. Screening tests ordered at today's visit but not completed yet may show as past due. Also, please note that scanned in results may not display below.  Preventive Screenings:  Service Recommendations Previous Testing/Comments   Colorectal Cancer Screening  * Colonoscopy    * Fecal Occult Blood Test (FOBT)/Fecal Immunochemical Test (FIT)  * Fecal DNA/Cologuard Test  * Flexible Sigmoidoscopy Age: 45-75 years old   Colonoscopy: every 10 years (may be performed more frequently if at higher risk)  OR  FOBT/FIT: every 1 year  OR  Cologuard: every 3 years  OR  Sigmoidoscopy: every 5 years  Screening may be recommended earlier than age 45 if at higher risk for colorectal cancer. Also, an individualized decision between you and your healthcare provider will decide whether screening between the ages of 76-85 would be appropriate. Colonoscopy: Not on file  FOBT/FIT: Not on file  Cologuard: Not on file  Sigmoidoscopy: Not on file          Breast Cancer Screening Age: 40+ years old  Frequency: every 1-2 years  Not required if history of left and right mastectomy Mammogram: 04/19/2024    Screening Current   Cervical Cancer Screening Between the ages of 21-29, pap smear recommended once every 3 years.   Between the ages of 30-65, can perform pap smear with HPV co-testing every 5 years.   Recommendations may differ for women with a history of total hysterectomy, cervical cancer, or abnormal pap smears in past. Pap Smear: 01/11/2023    Screening Not Indicated   Hepatitis C Screening Once for adults born between 1945 and 1965  More frequently in patients at high risk  for Hepatitis C Hep C Antibody: 04/01/2022    Screening Current   Diabetes Screening 1-2 times per year if you're at risk for diabetes or have pre-diabetes Fasting glucose: 88 mg/dL (8/13/2024)  A1C: No results in last 5 years (No results in last 5 years)  Screening Current   Cholesterol Screening Once every 5 years if you don't have a lipid disorder. May order more often based on risk factors. Lipid panel: 08/13/2024    Screening Not Indicated  History Lipid Disorder     Other Preventive Screenings Covered by Medicare:  Abdominal Aortic Aneurysm (AAA) Screening: covered once if your at risk. You're considered to be at risk if you have a family history of AAA.  Lung Cancer Screening: covers low dose CT scan once per year if you meet all of the following conditions: (1) Age 55-77; (2) No signs or symptoms of lung cancer; (3) Current smoker or have quit smoking within the last 15 years; (4) You have a tobacco smoking history of at least 20 pack years (packs per day multiplied by number of years you smoked); (5) You get a written order from a healthcare provider.  Glaucoma Screening: covered annually if you're considered high risk: (1) You have diabetes OR (2) Family history of glaucoma OR (3)  aged 50 and older OR (4)  American aged 65 and older  Osteoporosis Screening: covered every 2 years if you meet one of the following conditions: (1) You're estrogen deficient and at risk for osteoporosis based off medical history and other findings; (2) Have a vertebral abnormality; (3) On glucocorticoid therapy for more than 3 months; (4) Have primary hyperparathyroidism; (5) On osteoporosis medications and need to assess response to drug therapy.   Last bone density test (DXA Scan): 04/26/2021.  HIV Screening: covered annually if you're between the age of 15-65. Also covered annually if you are younger than 15 and older than 65 with risk factors for HIV infection. For pregnant patients, it is covered up  to 3 times per pregnancy.    Immunizations:  Immunization Recommendations   Influenza Vaccine Annual influenza vaccination during flu season is recommended for all persons aged >= 6 months who do not have contraindications   Pneumococcal Vaccine   * Pneumococcal conjugate vaccine = PCV13 (Prevnar 13), PCV15 (Vaxneuvance), PCV20 (Prevnar 20)  * Pneumococcal polysaccharide vaccine = PPSV23 (Pneumovax) Adults 19-65 yo with certain risk factors or if 65+ yo  If never received any pneumonia vaccine: recommend Prevnar 20 (PCV20)  Give PCV20 if previously received 1 dose of PCV13 or PPSV23   Hepatitis B Vaccine 3 dose series if at intermediate or high risk (ex: diabetes, end stage renal disease, liver disease)   Respiratory syncytial virus (RSV) Vaccine - COVERED BY MEDICARE PART D  * RSVPreF3 (Arexvy) CDC recommends that adults 60 years of age and older may receive a single dose of RSV vaccine using shared clinical decision-making (SCDM)   Tetanus (Td) Vaccine - COST NOT COVERED BY MEDICARE PART B Following completion of primary series, a booster dose should be given every 10 years to maintain immunity against tetanus. Td may also be given as tetanus wound prophylaxis.   Tdap Vaccine - COST NOT COVERED BY MEDICARE PART B Recommended at least once for all adults. For pregnant patients, recommended with each pregnancy.   Shingles Vaccine (Shingrix) - COST NOT COVERED BY MEDICARE PART B  2 shot series recommended in those 19 years and older who have or will have weakened immune systems or those 50 years and older     Health Maintenance Due:      Topic Date Due   • Colorectal Cancer Screening  Never done   • Breast Cancer Screening: Mammogram  04/19/2025   • Hepatitis C Screening  Completed     Immunizations Due:      Topic Date Due   • Pneumococcal Vaccine: 65+ Years (1 of 1 - PCV) Never done   • Influenza Vaccine (1) Never done     Advance Directives   What are advance directives?  Advance directives are legal documents  that state your wishes and plans for medical care. These plans are made ahead of time in case you lose your ability to make decisions for yourself. Advance directives can apply to any medical decision, such as the treatments you want, and if you want to donate organs.   What are the types of advance directives?  There are many types of advance directives, and each state has rules about how to use them. You may choose a combination of any of the following:  Living will:  This is a written record of the treatment you want. You can also choose which treatments you do not want, which to limit, and which to stop at a certain time. This includes surgery, medicine, IV fluid, and tube feedings.   Durable power of  for healthcare (DPAHC):  This is a written record that states who you want to make healthcare choices for you when you are unable to make them for yourself. This person, called a proxy, is usually a family member or a friend. You may choose more than 1 proxy.  Do not resuscitate (DNR) order:  A DNR order is used in case your heart stops beating or you stop breathing. It is a request not to have certain forms of treatment, such as CPR. A DNR order may be included in other types of advance directives.  Medical directive:  This covers the care that you want if you are in a coma, near death, or unable to make decisions for yourself. You can list the treatments you want for each condition. Treatment may include pain medicine, surgery, blood transfusions, dialysis, IV or tube feedings, and a ventilator (breathing machine).  Values history:  This document has questions about your views, beliefs, and how you feel and think about life. This information can help others choose the care that you would choose.  Why are advance directives important?  An advance directive helps you control your care. Although spoken wishes may be used, it is better to have your wishes written down. Spoken wishes can be misunderstood, or  not followed. Treatments may be given even if you do not want them. An advance directive may make it easier for your family to make difficult choices about your care.   Fall Prevention    Fall prevention  includes ways to make your home and other areas safer. It also includes ways you can move more carefully to prevent a fall. Health conditions that cause changes in your blood pressure, vision, or muscle strength and coordination may increase your risk for falls. Medicines may also increase your risk for falls if they make you dizzy, weak, or sleepy.   Fall prevention tips:   Stand or sit up slowly.    Use assistive devices as directed.    Wear shoes that fit well and have soles that .    Wear a personal alarm.    Stay active.    Manage your medical conditions.    Home Safety Tips:  Add items to prevent falls in the bathroom.    Keep paths clear.    Install bright lights in your home.    Keep items you use often on shelves within reach.    Paint or place reflective tape on the edges of your stairs.       © Copyright [employer] 2018 Information is for End User's use only and may not be sold, redistributed or otherwise used for commercial purposes. All illustrations and images included in CareNotes® are the copyrighted property of [employer] Inc[employer] or [employer]

## 2024-12-03 ENCOUNTER — TELEPHONE (OUTPATIENT)
Age: 74
End: 2024-12-03

## 2024-12-03 NOTE — TELEPHONE ENCOUNTER
"Contacted patient off of [facility]  to verify needs of services in attempts to offer patient an appointment. spoke with patient whom stated is no longer in need of services as when was seeking services it was \"situational\" and it is resolved.     Pt removed from [facility]  "

## 2025-02-14 ENCOUNTER — TELEPHONE (OUTPATIENT)
Age: 75
End: 2025-02-14

## 2025-02-14 NOTE — TELEPHONE ENCOUNTER
Patient called back and was asking about ultrasound with [facility]., advised patient order is in chart. Gave central scheduling number, will call to schedule along with mammogram

## 2025-02-14 NOTE — TELEPHONE ENCOUNTER
The patient call regarding the order for her Mammogram she would like to know if its a normal mammogram that she needs to have done she would like Dr. Lutz to add a order for Dense breast tissue.     Please advice

## 2025-03-04 ENCOUNTER — TELEPHONE (OUTPATIENT)
Age: 75
End: 2025-03-04

## 2025-03-04 DIAGNOSIS — Z11.59 ENCOUNTER FOR SCREENING FOR OTHER VIRAL DISEASES: ICD-10-CM

## 2025-03-04 DIAGNOSIS — Z20.5 EXPOSURE TO HEPATITIS B: Primary | ICD-10-CM

## 2025-03-04 NOTE — TELEPHONE ENCOUNTER
Pt called to say her  just tested positive for Hep B. She would like to be tested as well. She uses [facility].

## 2025-03-04 NOTE — TELEPHONE ENCOUNTER
Please let her know that I sent in an order to [facility] lab for her  She does not need to fast  Thank you,  Dr. Lutz

## 2025-03-05 ENCOUNTER — APPOINTMENT (OUTPATIENT)
Dept: LAB | Facility: CLINIC | Age: 75
End: 2025-03-05
Payer: COMMERCIAL

## 2025-03-05 DIAGNOSIS — Z20.5 EXPOSURE TO HEPATITIS B: ICD-10-CM

## 2025-03-05 DIAGNOSIS — Z11.59 ENCOUNTER FOR SCREENING FOR OTHER VIRAL DISEASES: ICD-10-CM

## 2025-03-05 PROCEDURE — 87340 HEPATITIS B SURFACE AG IA: CPT

## 2025-03-05 PROCEDURE — 36415 COLL VENOUS BLD VENIPUNCTURE: CPT

## 2025-03-06 ENCOUNTER — RESULTS FOLLOW-UP (OUTPATIENT)
Dept: FAMILY MEDICINE CLINIC | Facility: CLINIC | Age: 75
End: 2025-03-06

## 2025-03-06 LAB — HBV SURFACE AG SER QL: NORMAL

## 2025-04-22 ENCOUNTER — HOSPITAL ENCOUNTER (OUTPATIENT)
Dept: RADIOLOGY | Age: 75
Discharge: HOME | End: 2025-04-22
Payer: COMMERCIAL

## 2025-04-22 DIAGNOSIS — Z12.31 SCREENING MAMMOGRAM, ENCOUNTER FOR: ICD-10-CM

## 2025-04-22 DIAGNOSIS — R92.30 DENSE BREAST: ICD-10-CM

## 2025-04-22 PROCEDURE — 77067 SCR MAMMO BI INCL CAD: CPT

## 2025-04-22 PROCEDURE — 76641 ULTRASOUND BREAST COMPLETE: CPT

## 2025-04-22 PROCEDURE — 77063 BREAST TOMOSYNTHESIS BI: CPT

## 2025-04-23 ENCOUNTER — RESULTS FOLLOW-UP (OUTPATIENT)
Dept: FAMILY MEDICINE CLINIC | Facility: CLINIC | Age: 75
End: 2025-04-23

## 2025-04-24 ENCOUNTER — TELEPHONE (OUTPATIENT)
Age: 75
End: 2025-04-24

## 2025-04-24 NOTE — TELEPHONE ENCOUNTER
Patient calling in to get scheduled for symptoms below. There are no availability and would like clinical to call her back    vaginal dryness, discuss Hep B vaccine, go over Mammo results

## 2025-04-29 ENCOUNTER — OFFICE VISIT (OUTPATIENT)
Dept: FAMILY MEDICINE CLINIC | Facility: CLINIC | Age: 75
End: 2025-04-29
Payer: COMMERCIAL

## 2025-04-29 VITALS
DIASTOLIC BLOOD PRESSURE: 84 MMHG | TEMPERATURE: 96.8 F | HEART RATE: 72 BPM | HEIGHT: 65 IN | WEIGHT: 144 LBS | SYSTOLIC BLOOD PRESSURE: 134 MMHG | RESPIRATION RATE: 16 BRPM | BODY MASS INDEX: 23.99 KG/M2

## 2025-04-29 DIAGNOSIS — Z20.5 EXPOSURE TO HEPATITIS B: ICD-10-CM

## 2025-04-29 DIAGNOSIS — N95.1 MENOPAUSAL VAGINAL DRYNESS: Primary | ICD-10-CM

## 2025-04-29 PROCEDURE — G2211 COMPLEX E/M VISIT ADD ON: HCPCS | Performed by: FAMILY MEDICINE

## 2025-04-29 PROCEDURE — 99213 OFFICE O/P EST LOW 20 MIN: CPT | Performed by: FAMILY MEDICINE

## 2025-04-29 RX ORDER — CONJUGATED ESTROGENS 0.62 MG/G
0.5 CREAM VAGINAL DAILY
Qty: 30 G | Refills: 3 | Status: SHIPPED | OUTPATIENT
Start: 2025-04-29

## 2025-04-29 NOTE — PROGRESS NOTES
"Name: Vilma Brar      : 1950      MRN: [id number]  Encounter Provider: Jeni Lutz DO  Encounter Date: 2025   Encounter department: [facility]  :  Assessment & Plan  Menopausal vaginal dryness  Risk-benefit of medication discussed  Discussed that since she does still have a uterus this may increase her risk of uterine cancer but she is going to use the least amount medication as possible.  She is can to start with using the cream daily and after few weeks decrease to 3-2 times a week  Orders:  •  estrogens, conjugated (Premarin) vaginal cream; Insert 0.5 g into the vagina daily  •  Ambulatory referral to Obstetrics / Gynecology; Future    Exposure to hepatitis B  Recommended hepatitis B vaccine  She is going to check with insurance regarding coverage         Assessment & Plan      Return for Annual Wellness Visit on or after 25.     History of Present Illness   She is getting vaginal dryness.  She has tried coconut oil.   She is ready to try a prescription hormone topical treatment.      Review of Systems    Objective   /84   Pulse 72   Temp (!) 96.8 °F (36 °C)   Resp 16   Ht 5' 5\" (1.651 m)   Wt 65.3 kg (144 lb)   BMI 23.96 kg/m²      Physical Exam  Vitals and nursing note reviewed.   Constitutional:       General: She is not in acute distress.     Appearance: She is well-developed.   HENT:      Head: Normocephalic and atraumatic.      Right Ear: Tympanic membrane normal.      Left Ear: Tympanic membrane normal.   Cardiovascular:      Rate and Rhythm: Normal rate and regular rhythm.      Heart sounds: No murmur heard.  Pulmonary:      Effort: Pulmonary effort is normal. No respiratory distress.      Breath sounds: Normal breath sounds.   Musculoskeletal:      Cervical back: Neck supple.   Neurological:      Mental Status: She is alert.         "

## 2025-04-29 NOTE — ASSESSMENT & PLAN NOTE
Risk-benefit of medication discussed  Discussed that since she does still have a uterus this may increase her risk of uterine cancer but she is going to use the least amount medication as possible.  She is can to start with using the cream daily and after few weeks decrease to 3-2 times a week  Orders:  •  estrogens, conjugated (Premarin) vaginal cream; Insert 0.5 g into the vagina daily  •  Ambulatory referral to Obstetrics / Gynecology; Future

## 2025-05-06 ENCOUNTER — OFFICE VISIT (OUTPATIENT)
Dept: OBGYN CLINIC | Facility: CLINIC | Age: 75
End: 2025-05-06
Payer: COMMERCIAL

## 2025-05-06 ENCOUNTER — NURSE TRIAGE (OUTPATIENT)
Age: 75
End: 2025-05-06

## 2025-05-06 VITALS — WEIGHT: 148 LBS | DIASTOLIC BLOOD PRESSURE: 80 MMHG | SYSTOLIC BLOOD PRESSURE: 130 MMHG | BODY MASS INDEX: 24.63 KG/M2

## 2025-05-06 DIAGNOSIS — R30.0 BURNING WITH URINATION: Primary | ICD-10-CM

## 2025-05-06 DIAGNOSIS — N89.8 VAGINAL DISCHARGE: ICD-10-CM

## 2025-05-06 DIAGNOSIS — Z12.11 COLON CANCER SCREENING: ICD-10-CM

## 2025-05-06 LAB
SL AMB  POCT GLUCOSE, UA: ABNORMAL
SL AMB LEUKOCYTE ESTERASE,UA: ABNORMAL
SL AMB POCT BILIRUBIN,UA: ABNORMAL
SL AMB POCT BLOOD,UA: ABNORMAL
SL AMB POCT CLARITY,UA: ABNORMAL
SL AMB POCT COLOR,UA: YELLOW
SL AMB POCT KETONES,UA: ABNORMAL
SL AMB POCT NITRITE,UA: ABNORMAL
SL AMB POCT PH,UA: 5
SL AMB POCT SPECIFIC GRAVITY,UA: 1.02
SL AMB POCT URINE PROTEIN: 15
SL AMB POCT UROBILINOGEN: 0.2

## 2025-05-06 PROCEDURE — 99214 OFFICE O/P EST MOD 30 MIN: CPT | Performed by: OBSTETRICS & GYNECOLOGY

## 2025-05-06 PROCEDURE — 81002 URINALYSIS NONAUTO W/O SCOPE: CPT | Performed by: OBSTETRICS & GYNECOLOGY

## 2025-05-06 NOTE — PROGRESS NOTES
:  Assessment & Plan  Burning with urination    Orders:  •  POCT urine dip  •  Urine culture    Vaginal discharge    Orders:  •  SURESWAB ADVANCED VAGINITIS, TMA    Colon cancer screening    Orders:  •  Cologuard        History of Present Illness     Alyssa Brar is a 74-year-old female with significant past medical history of venous insufficiency, chronic rhinitis, liver lesion, diverticulitis of colon, osteoporosis, arthritis, actinic keratosis, and mixed hyperlipidemia. presenting to the office for vaginal dryness and irritation x 1 month.    Patient explains she has been experiencing vaginal dryness and irritation for about a month.  She was seen by her PCP and prescribed estrogen cream, but she has not filled this prescription at her pharmacy.  She wanted a second opinion.    She has tried using coconut oil for the vaginal dryness, which has provided some relief.  However she explains the dryness and irritation will come back.    Patient is concerned because she has a dental procedure this Thursday and wants to make sure she does not have a UTI.    Urine culture was obtained at today's visit.  Will reach out to patient with results and any further treatment if necessary.    A vaginal culture was also obtained at today's visit.  Will reach out to patient with results and any further treatment if necessary.    Patient counseled on the use of estrogen cream.  Patient instructed to use a fourth of applicator 1-2 times a week.    Patient also educated on the use of teabag soaks.  Patient instructed to use 5-7 teabags in a warm bath and soak for at least 10 minutes.  Patient instructed that this can help with vaginal irritation    Patient also expressed that she is not up-to-date with colon cancer screening and would like Cologuard ordered for her at today's visit.    Patient expressed understanding.      All questions answered at this time, patient encouraged to reach out with any further questions or  concerns.      Total time of today's visit was 25 minutes of which greater than 50% was spent face-to-face counseling the patient as well as coordination of care, review of chart and lab values, physical examination as well as computer entry into the [employer] medical record system.          Review of Systems   Constitutional: Negative.  Negative for appetite change, diaphoresis, fatigue, fever and unexpected weight change.   HENT: Negative.          History of chronic rhinitis.   Eyes: Negative.    Respiratory: Negative.     Cardiovascular: Negative.         Following for hyperlipidemia and venous insufficiency.   Gastrointestinal: Negative.  Negative for abdominal pain, blood in stool, constipation, diarrhea, nausea and vomiting.        Following for diverticulitis of colon.  History of liver lesion.   Endocrine: Negative.  Negative for cold intolerance and heat intolerance.   Genitourinary: Negative.  Negative for dysuria, frequency, hematuria, urgency, vaginal bleeding, vaginal discharge and vaginal pain.   Musculoskeletal: Negative.         Following for osteoporosis and arthritis.   Skin: Negative.         History of actinic keratosis.   Allergic/Immunologic: Negative.    Neurological: Negative.    Hematological: Negative.  Negative for adenopathy.   Psychiatric/Behavioral: Negative.       Objective   /80   Wt 67.1 kg (148 lb)   BMI 24.63 kg/m²      Physical Exam  Constitutional:       Appearance: Normal appearance.   HENT:      Head: Normocephalic and atraumatic.   Eyes:      Pupils: Pupils are equal, round, and reactive to light.   Cardiovascular:      Rate and Rhythm: Normal rate and regular rhythm.   Pulmonary:      Effort: Pulmonary effort is normal.      Breath sounds: Normal breath sounds.   Genitourinary:     General: Normal vulva.      Labia:         Right: No rash, tenderness, lesion or injury.         Left: No rash, tenderness, lesion or injury.       Vagina: No erythema, bleeding or lesions.       Cervix: No lesion or erythema.      Uterus: Normal. Not enlarged and not tender.       Adnexa: Right adnexa normal and left adnexa normal.        Right: No mass, tenderness or fullness.          Left: No mass, tenderness or fullness.        Comments: Evidence of atrophic vaginitis.  Good pelvic support.  Musculoskeletal:         General: Normal range of motion.      Cervical back: Normal range of motion and neck supple.   Skin:     General: Skin is warm and dry.   Neurological:      General: No focal deficit present.      Mental Status: She is alert and oriented to person, place, and time.   Psychiatric:         Mood and Affect: Mood normal.         Behavior: Behavior normal.         Thought Content: Thought content normal.         Judgment: Judgment normal.

## 2025-05-06 NOTE — TELEPHONE ENCOUNTER
"FOLLOW UP:   Pt is scheduled for appt today in office     REASON FOR CONVERSATION: Vaginitis    SYMPTOMS: vaginal dryness, and external irritation, dark colored urine     OTHER: Pt calling in saying that she's having vaginal dryness, and pain with urinating due to external irritation, burning sensation, pt saying she's scheduled for a complicated oral surgery scheduled for Thursday, and would like to be seen before then. Pt reporting darker urine, pt reporting drinking fluids. Pt has tried coconut oil for irritation and saying it hasn't helped.    Pt is advised to wear cotton underwear, use coconut oil for discomfort, and to call back if symptoms worsen, pt verbalized understanding.     DISPOSITION: See Today in Office      Reason for Disposition   MILD-MODERATE vaginal pain and present > 24 hours (Exception: Chronic pain.)    Answer Assessment - Initial Assessment Questions  1. SYMPTOM: \"What's the main symptom you're concerned about?\" (e.g., pain, itching, dryness)      Vaginal dryness, and external irritation   2. LOCATION: \"Where is the  symptoms  located?\" (e.g., inside/outside, left/right)      Externally   3. ONSET: \"When did the  symptoms  start?\"      3 weeks ago   4. PAIN: \"Is there any pain?\" If Yes, ask: \"How bad is it?\" (Scale: 1-10; mild, moderate, severe)      5/10 burning pain   5. ITCHING: \"Is there any itching?\" If Yes, ask: \"How bad is it?\" (Scale: 1-10; mild, moderate, severe)      Pt denies   6. CAUSE: \"What do you think is causing the discharge?\" \"Have you had the same problem before?\" \"What happened then?\"      Pt denies   7. OTHER SYMPTOMS: \"Do you have any other symptoms?\" (e.g., fever, itching, vaginal bleeding, pain with urination, injury to genital area)      Pt denies fever, itching, vaginal bleeding, pain with urination, injury to genital area  8. PREGNANCY: \"Is there any chance you are pregnant?\" \"When was your last menstrual period?\"      Postmenopausal    Protocols used: Vaginal " Symptoms-Adult-[place]

## 2025-05-08 LAB
BV BACTERIA RRNA VAG QL NAA+PROBE: NEGATIVE
C GLABRATA RNA VAG QL NAA+PROBE: NOT DETECTED
CANDIDA RRNA VAG QL PROBE: NOT DETECTED
T VAGINALIS RRNA SPEC QL NAA+PROBE: NOT DETECTED

## 2025-05-09 ENCOUNTER — TELEPHONE (OUTPATIENT)
Dept: OBGYN CLINIC | Facility: CLINIC | Age: 75
End: 2025-05-09

## 2025-05-09 ENCOUNTER — RESULTS FOLLOW-UP (OUTPATIENT)
Dept: OBGYN CLINIC | Facility: CLINIC | Age: 75
End: 2025-05-09

## 2025-05-09 NOTE — TELEPHONE ENCOUNTER
Personally spoke with the patient this morning and advised her that I had received her final urine culture and reassured her that a single bacteria was present but it was not there in high enough numbers to warrant any type of antibiotic therapy.    Patient will be utilizing the estrogen vaginal cream as prescribed    She also advised me that after her oral surgery yesterday that the practitioner had placed her on antibiotic therapy as well.  I advised her that that antibiotic Cipro will also cleanse her bladder too.    Will see patient in June for atrophic vaginitis follow-up    Patient advised to feel free to call for any problems, questions, issues or concerns which arise for her.

## 2025-06-02 ENCOUNTER — ANNUAL EXAM (OUTPATIENT)
Dept: OBGYN CLINIC | Facility: CLINIC | Age: 75
End: 2025-06-02
Payer: COMMERCIAL

## 2025-06-02 VITALS — DIASTOLIC BLOOD PRESSURE: 80 MMHG | WEIGHT: 141.4 LBS | BODY MASS INDEX: 23.53 KG/M2 | SYSTOLIC BLOOD PRESSURE: 136 MMHG

## 2025-06-02 DIAGNOSIS — Z01.419 ENCOUNTER FOR GYNECOLOGICAL EXAMINATION WITHOUT ABNORMAL FINDING: ICD-10-CM

## 2025-06-02 DIAGNOSIS — Z12.31 ENCOUNTER FOR SCREENING MAMMOGRAM FOR MALIGNANT NEOPLASM OF BREAST: Primary | ICD-10-CM

## 2025-06-02 DIAGNOSIS — N95.2 ATROPHIC VAGINITIS: ICD-10-CM

## 2025-06-02 PROCEDURE — G0101 CA SCREEN;PELVIC/BREAST EXAM: HCPCS | Performed by: OBSTETRICS & GYNECOLOGY

## 2025-06-02 NOTE — PATIENT INSTRUCTIONS
Informed pt that her physical exam revealed evidence of vaginal atrophy. Encouraged pt to use estrogen cream prescribed at last visit 2x/week for maintenance.     Self-breast examination stressed.  Mammogram ordered.  Discussed regular exercise, healthy diet, importance of vitamin D and calcium supplements.  Discussed importance of sun block use during periods of prolonged sun exposure.  Patient will be seen in 1 year for routine gynecologic and medical examination.  Patient will call office for any problems, concerns, or issues which may arise during the interim.

## 2025-06-02 NOTE — PROGRESS NOTES
"Assessment/Plan:    No problem-specific Assessment & Plan notes found for this encounter.       Diagnoses and all orders for this visit:    Encounter for screening mammogram for malignant neoplasm of breast    Encounter for gynecological examination without abnormal finding    Atrophic vaginitis      - Encouraged pt to use estrogen cream prescribed at last visit-- pea-sized amount to around urethral orifice and area of irritation 2x/weekly.   - Pt has prescription.       Self-breast examination stressed.  Mammogram ordered.  Discussed regular exercise, healthy diet, importance of vitamin D and calcium supplements.  Discussed importance of sun block use during periods of prolonged sun exposure.  Patient will be seen in 1 year for routine gynecologic and medical examination.  Patient will call office for any problems, concerns, or issues which may arise during the interim.     Subjective:      Alyssa Brar is a 73 yo F with PMH sig for hyperlipidemia, osteoporosis and vaginal dryness presenting to the office for her yearly exam. UTD on mammograms and paps. She is due for colonoscopy. BP and blood sugar are well controlled and she regularly sees her PCP. Continue to monitor cholesterol.     The patient was seen last month for vaginal dryness of left labia. She was rx'd estrogen cream to begin using at the site of irritation. Pt did not initiate cream due to unrelated oral surgery for which she received cipro prophylaxis. Pt notes that coincidentally the dry patch on the L labia resolved during the cipro course, but recurred 5 days after completing cipro course. She reports that the area hurts when she pees, described as a \"stinging\"/\"burning\" sensation when the urine contacts the dry labia.    Pt denies vaginal bleeding, spotting, discharge, abdominal pain, pelvic pain, n/v, diarrhea, constipation. Denies hematuria, hematochezia, chest pain, sob, hot/cold intolerances, fevers, chills, unintentional weight loss/gain, " breast tenderness, masses.    Informed pt that her physical exam revealed evidence of vaginal atrophy. Encouraged pt to use estrogen cream prescribed a last visit 2x/week for maintenance.     Follow up in one year for next annual exam.     Call office with any questions, comments, or concerns in the interim.       Gynecologic Exam  She reports no pelvic pain or vaginal discharge. Pertinent negatives include no chills, constipation, diarrhea, dysuria, fever, flank pain, frequency, hematuria, nausea, urgency or vomiting.       Patient ID: Vilma Brar is a 74 y.o. female who presents today for her annual gynecologic and medical examination    Menstrual status: Postmenopausal    Vasomotor symptoms: None    Patient reports normal appetite    Patient reports normal bowel and bladder habits    Patient denies any significant pelvic or abdominal pain    Patient denies any headaches, chest pain, shortness of breath fever shakes or chills    Patient denies any COVID 19 symptoms including cough or loss of taste or smell    COVID vaccine status: Patient aware of COVID vaccination protocols.    Medical problems: hyperlipidemia, osteoporosis    Colonoscopy status: Due for colonoscopy. Order placed into EMR in 08/2024.     Mammogram status: Patient does regular self breast exams and is up-to-date with screening mammography. Appropriate arrangements for her annual screening mammogram are placed into the EMR system at this visit.      The following portions of the patient's history were reviewed and updated as appropriate: allergies, current medications, past family history, past medical history, past social history, past surgical history and problem list.    Review of Systems   Constitutional:  Negative for appetite change, chills, diaphoresis, fatigue, fever and unexpected weight change.   Respiratory:  Negative for shortness of breath. Wheezing: Followed for hyperlipidemia.   Cardiovascular:  Negative for chest pain,  palpitations and leg swelling.   Gastrointestinal:  Negative for blood in stool, constipation, diarrhea, nausea and vomiting.   Endocrine: Negative for cold intolerance and heat intolerance.   Genitourinary:  Negative for decreased urine volume, difficulty urinating, dyspareunia, dysuria, flank pain, frequency, genital sores, hematuria, pelvic pain, urgency, vaginal bleeding, vaginal discharge and vaginal pain.         Objective:    /80   Wt 64.1 kg (141 lb 6.4 oz)   BMI 23.53 kg/m²      Physical Exam  Vitals reviewed. Exam conducted with a chaperone present.   Constitutional:       General: She is not in acute distress.     Appearance: Normal appearance.   HENT:      Head: Normocephalic and atraumatic.     Eyes:      Pupils: Pupils are equal, round, and reactive to light.       Cardiovascular:      Rate and Rhythm: Normal rate and regular rhythm.      Heart sounds: No murmur heard.  Pulmonary:      Effort: Pulmonary effort is normal. No respiratory distress.      Breath sounds: Normal breath sounds.   Chest:   Breasts:     Breasts are symmetrical.      Right: Normal. No inverted nipple, mass, nipple discharge or tenderness.      Left: Normal. No inverted nipple, mass, nipple discharge or tenderness.   Abdominal:      Palpations: Abdomen is soft.   Genitourinary:     General: Normal vulva.      Exam position: Supine.      Labia:         Right: No rash or lesion.         Left: No rash or lesion.       Vagina: Normal. No vaginal discharge, erythema, tenderness, lesions or prolapsed vaginal walls.      Cervix: Normal. No discharge, friability or lesion.      Uterus: Normal. Not enlarged and not tender.       Adnexa: Right adnexa normal and left adnexa normal.        Right: No mass, tenderness or fullness.          Left: No mass, tenderness or fullness.        Rectum: Normal. Guaiac result negative.      Comments: Atrophy of BL labia minora and vaginal mucosa.     Musculoskeletal:         General: No swelling.  Normal range of motion.      Cervical back: Neck supple.   Lymphadenopathy:      Cervical: No cervical adenopathy.      Upper Body:      Right upper body: No supraclavicular adenopathy.      Left upper body: No supraclavicular adenopathy.     Skin:     General: Skin is warm and dry.     Neurological:      General: No focal deficit present.     Psychiatric:         Mood and Affect: Mood normal.         Behavior: Behavior normal.         Thought Content: Thought content normal.         Judgment: Judgment normal.

## 2025-07-23 ENCOUNTER — TELEPHONE (OUTPATIENT)
Age: 75
End: 2025-07-23

## 2025-07-23 NOTE — TELEPHONE ENCOUNTER
Called and spoke w/patient and advised her that she needed an appt. She says she is laying down but will go to Urgent care if she's not feeling better.    MAGALIE

## 2025-07-23 NOTE — TELEPHONE ENCOUNTER
Patient called in stated her and  went out to a restaurant and woke up feeling sick,  is feeling a little better but patient stated still not feeling good. Patient would like to know if there blood work that can be done to test for e coli. Patient would like a call back. Thank you

## 2025-07-25 NOTE — TELEPHONE ENCOUNTER
Patient called is having bloating, discomfort feeling in stomach. Would like to know if food poisoning can affect diverticulitis and bring on a flare up. Please review, advise and call patient   - - -

## 2025-07-28 NOTE — TELEPHONE ENCOUNTER
Food poisoning possibly could trigger a flare of diverticulitis.  Ask her to make an appointment.  It really sounds like she needs to be evaluated in person  Thank you,  Jeni Lutz, DO

## 2025-07-29 NOTE — TELEPHONE ENCOUNTER
I spoke with patient she states after 5 days she is recovering. She refused appointment and was upset it took so long for someone to get back to her.     She will call for an appointment if anything changes.     No further action.

## 2025-08-06 ENCOUNTER — TELEPHONE (OUTPATIENT)
Age: 75
End: 2025-08-06